# Patient Record
Sex: FEMALE | URBAN - METROPOLITAN AREA
[De-identification: names, ages, dates, MRNs, and addresses within clinical notes are randomized per-mention and may not be internally consistent; named-entity substitution may affect disease eponyms.]

---

## 2023-09-25 ENCOUNTER — APPOINTMENT (RX ONLY)
Dept: URBAN - METROPOLITAN AREA CLINIC 25 | Facility: CLINIC | Age: 28
Setting detail: DERMATOLOGY
End: 2023-09-25

## 2023-09-25 DIAGNOSIS — L65.0 TELOGEN EFFLUVIUM: ICD-10-CM

## 2023-09-25 DIAGNOSIS — L64.8 OTHER ANDROGENIC ALOPECIA: ICD-10-CM

## 2023-09-25 DIAGNOSIS — L70.0 ACNE VULGARIS: ICD-10-CM

## 2023-09-25 PROCEDURE — ? COUNSELING

## 2023-09-25 PROCEDURE — ? PRESCRIPTION

## 2023-09-25 PROCEDURE — ? OTHER

## 2023-09-25 PROCEDURE — 99204 OFFICE O/P NEW MOD 45 MIN: CPT

## 2023-09-25 PROCEDURE — ? PRESCRIPTION MEDICATION MANAGEMENT

## 2023-09-25 RX ORDER — SPIRONOLACTONE 50 MG/1
TABLET, FILM COATED ORAL
Qty: 90 | Refills: 4 | Status: ERX | COMMUNITY
Start: 2023-09-25

## 2023-09-25 RX ADMIN — SPIRONOLACTONE: 50 TABLET, FILM COATED ORAL at 00:00

## 2023-09-25 ASSESSMENT — LOCATION SIMPLE DESCRIPTION DERM
LOCATION SIMPLE: RIGHT CHEEK
LOCATION SIMPLE: LEFT CHEEK
LOCATION SIMPLE: ANTERIOR SCALP
LOCATION SIMPLE: SCALP

## 2023-09-25 ASSESSMENT — LOCATION ZONE DERM
LOCATION ZONE: SCALP
LOCATION ZONE: FACE

## 2023-09-25 ASSESSMENT — LOCATION DETAILED DESCRIPTION DERM
LOCATION DETAILED: LEFT SUPERIOR PARIETAL SCALP
LOCATION DETAILED: MID-FRONTAL SCALP
LOCATION DETAILED: LEFT INFERIOR CENTRAL MALAR CHEEK
LOCATION DETAILED: RIGHT CENTRAL MALAR CHEEK

## 2023-09-25 NOTE — HPI: HAIR LOSS
Previous Labs: Yes
How Did The Hair Loss Occur?: gradual in onset
How Severe Is Your Hair Loss?: mild
What Hair Products Do You Use?: Cesario hair story

## 2023-09-25 NOTE — PROCEDURE: COUNSELING
Detail Level: Zone
Detail Level: Detailed
Azithromycin Counseling:  I discussed with the patient the risks of azithromycin including but not limited to GI upset, allergic reaction, drug rash, diarrhea, and yeast infections.
Topical Sulfur Applications Counseling: Topical Sulfur Counseling: Patient counseled that this medication may cause skin irritation or allergic reactions.  In the event of skin irritation, the patient was advised to reduce the amount of the drug applied or use it less frequently.   The patient verbalized understanding of the proper use and possible adverse effects of topical sulfur application.  All of the patient's questions and concerns were addressed.
Doxycycline Pregnancy And Lactation Text: This medication is Pregnancy Category D and not consider safe during pregnancy. It is also excreted in breast milk but is considered safe for shorter treatment courses.
Bactrim Counseling:  I discussed with the patient the risks of sulfa antibiotics including but not limited to GI upset, allergic reaction, drug rash, diarrhea, dizziness, photosensitivity, and yeast infections.  Rarely, more serious reactions can occur including but not limited to aplastic anemia, agranulocytosis, methemoglobinemia, blood dyscrasias, liver or kidney failure, lung infiltrates or desquamative/blistering drug rashes.
Azelaic Acid Pregnancy And Lactation Text: This medication is considered safe during pregnancy and breast feeding.
Aklief Pregnancy And Lactation Text: It is unknown if this medication is safe to use during pregnancy.  It is unknown if this medication is excreted in breast milk.  Breastfeeding women should use the topical cream on the smallest area of the skin for the shortest time needed while breastfeeding.  Do not apply to nipple and areola.
Topical Clindamycin Counseling: Patient counseled that this medication may cause skin irritation or allergic reactions.  In the event of skin irritation, the patient was advised to reduce the amount of the drug applied or use it less frequently.   The patient verbalized understanding of the proper use and possible adverse effects of clindamycin.  All of the patient's questions and concerns were addressed.
Minocycline Counseling: Patient advised regarding possible photosensitivity and discoloration of the teeth, skin, lips, tongue and gums.  Patient instructed to avoid sunlight, if possible.  When exposed to sunlight, patients should wear protective clothing, sunglasses, and sunscreen.  The patient was instructed to call the office immediately if the following severe adverse effects occur:  hearing changes, easy bruising/bleeding, severe headache, or vision changes.  The patient verbalized understanding of the proper use and possible adverse effects of minocycline.  All of the patient's questions and concerns were addressed.
Sarecycline Counseling: Patient advised regarding possible photosensitivity and discoloration of the teeth, skin, lips, tongue and gums.  Patient instructed to avoid sunlight, if possible.  When exposed to sunlight, patients should wear protective clothing, sunglasses, and sunscreen.  The patient was instructed to call the office immediately if the following severe adverse effects occur:  hearing changes, easy bruising/bleeding, severe headache, or vision changes.  The patient verbalized understanding of the proper use and possible adverse effects of sarecycline.  All of the patient's questions and concerns were addressed.
Dapsone Pregnancy And Lactation Text: This medication is Pregnancy Category C and is not considered safe during pregnancy or breast feeding.
Tazorac Counseling:  Patient advised that medication is irritating and drying.  Patient may need to apply sparingly and wash off after an hour before eventually leaving it on overnight.  The patient verbalized understanding of the proper use and possible adverse effects of tazorac.  All of the patient's questions and concerns were addressed.
Birth Control Pills Pregnancy And Lactation Text: This medication should be avoided if pregnant and for the first 30 days post-partum.
Tetracycline Pregnancy And Lactation Text: This medication is Pregnancy Category D and not consider safe during pregnancy. It is also excreted in breast milk.
Include Pregnancy/Lactation Warning?: No
High Dose Vitamin A Counseling: Side effects reviewed, pt to contact office should one occur.
Topical Sulfur Applications Pregnancy And Lactation Text: This medication is Pregnancy Category C and has an unknown safety profile during pregnancy. It is unknown if this topical medication is excreted in breast milk.
Isotretinoin Counseling: Patient should get monthly blood tests, not donate blood, not drive at night if vision affected, not share medication, and not undergo elective surgery for 6 months after tx completed. Side effects reviewed, pt to contact office should one occur.
Spironolactone Pregnancy And Lactation Text: This medication can cause feminization of the male fetus and should be avoided during pregnancy. The active metabolite is also found in breast milk.
Winlevi Pregnancy And Lactation Text: This medication is considered safe during pregnancy and breastfeeding.
Bactrim Pregnancy And Lactation Text: This medication is Pregnancy Category D and is known to cause fetal risk.  It is also excreted in breast milk.
Topical Retinoid counseling:  Patient advised to apply a pea-sized amount only at bedtime and wait 30 minutes after washing their face before applying.  If too drying, patient may add a non-comedogenic moisturizer. The patient verbalized understanding of the proper use and possible adverse effects of retinoids.  All of the patient's questions and concerns were addressed.
Erythromycin Counseling:  I discussed with the patient the risks of erythromycin including but not limited to GI upset, allergic reaction, drug rash, diarrhea, increase in liver enzymes, and yeast infections.
Benzoyl Peroxide Counseling: Patient counseled that medicine may cause skin irritation and bleach clothing.  In the event of skin irritation, the patient was advised to reduce the amount of the drug applied or use it less frequently.   The patient verbalized understanding of the proper use and possible adverse effects of benzoyl peroxide.  All of the patient's questions and concerns were addressed.
Azithromycin Pregnancy And Lactation Text: This medication is considered safe during pregnancy and is also secreted in breast milk.
Tazorac Pregnancy And Lactation Text: This medication is not safe during pregnancy. It is unknown if this medication is excreted in breast milk.
Doxycycline Counseling:  Patient counseled regarding possible photosensitivity and increased risk for sunburn.  Patient instructed to avoid sunlight, if possible.  When exposed to sunlight, patients should wear protective clothing, sunglasses, and sunscreen.  The patient was instructed to call the office immediately if the following severe adverse effects occur:  hearing changes, easy bruising/bleeding, severe headache, or vision changes.  The patient verbalized understanding of the proper use and possible adverse effects of doxycycline.  All of the patient's questions and concerns were addressed.
Azelaic Acid Counseling: Patient counseled that medicine may cause skin irritation and to avoid applying near the eyes.  In the event of skin irritation, the patient was advised to reduce the amount of the drug applied or use it less frequently.   The patient verbalized understanding of the proper use and possible adverse effects of azelaic acid.  All of the patient's questions and concerns were addressed.
Topical Clindamycin Pregnancy And Lactation Text: This medication is Pregnancy Category B and is considered safe during pregnancy. It is unknown if it is excreted in breast milk.
Birth Control Pills Counseling: Birth Control Pill Counseling: I discussed with the patient the potential side effects of OCPs including but not limited to increased risk of stroke, heart attack, thrombophlebitis, deep venous thrombosis, hepatic adenomas, breast changes, GI upset, headaches, and depression.  The patient verbalized understanding of the proper use and possible adverse effects of OCPs. All of the patient's questions and concerns were addressed.
Isotretinoin Pregnancy And Lactation Text: This medication is Pregnancy Category X and is considered extremely dangerous during pregnancy. It is unknown if it is excreted in breast milk.
High Dose Vitamin A Pregnancy And Lactation Text: High dose vitamin A therapy is contraindicated during pregnancy and breast feeding.
Dapsone Counseling: I discussed with the patient the risks of dapsone including but not limited to hemolytic anemia, agranulocytosis, rashes, methemoglobinemia, kidney failure, peripheral neuropathy, headaches, GI upset, and liver toxicity.  Patients who start dapsone require monitoring including baseline LFTs and weekly CBCs for the first month, then every month thereafter.  The patient verbalized understanding of the proper use and possible adverse effects of dapsone.  All of the patient's questions and concerns were addressed.
Aklief counseling:  Patient advised to apply a pea-sized amount only at bedtime and wait 30 minutes after washing their face before applying.  If too drying, patient may add a non-comedogenic moisturizer.  The most commonly reported side effects including irritation, redness, scaling, dryness, stinging, burning, itching, and increased risk of sunburn.  The patient verbalized understanding of the proper use and possible adverse effects of retinoids.  All of the patient's questions and concerns were addressed.
Benzoyl Peroxide Pregnancy And Lactation Text: This medication is Pregnancy Category C. It is unknown if benzoyl peroxide is excreted in breast milk.
Winlevi Counseling:  I discussed with the patient the risks of topical clascoterone including but not limited to erythema, scaling, itching, and stinging. Patient voiced their understanding.
Spironolactone Counseling: Patient advised regarding risks of diarrhea, abdominal pain, hyperkalemia, birth defects (for female patients), liver toxicity and renal toxicity. The patient may need blood work to monitor liver and kidney function and potassium levels while on therapy. The patient verbalized understanding of the proper use and possible adverse effects of spironolactone.  All of the patient's questions and concerns were addressed.
Erythromycin Pregnancy And Lactation Text: This medication is Pregnancy Category B and is considered safe during pregnancy. It is also excreted in breast milk.
Tetracycline Counseling: Patient counseled regarding possible photosensitivity and increased risk for sunburn.  Patient instructed to avoid sunlight, if possible.  When exposed to sunlight, patients should wear protective clothing, sunglasses, and sunscreen.  The patient was instructed to call the office immediately if the following severe adverse effects occur:  hearing changes, easy bruising/bleeding, severe headache, or vision changes.  The patient verbalized understanding of the proper use and possible adverse effects of tetracycline.  All of the patient's questions and concerns were addressed. Patient understands to avoid pregnancy while on therapy due to potential birth defects.
Topical Retinoid Pregnancy And Lactation Text: This medication is Pregnancy Category C. It is unknown if this medication is excreted in breast milk.

## 2023-09-25 NOTE — PROCEDURE: PRESCRIPTION MEDICATION MANAGEMENT
Plan: Patient not family planning at this time. \\nDiscontinue if becomes pregnant, patient understands this is not meant for pregnancy \\nCan lower BP, raise K in blood (not routinely checked in patients under 45)\\nCan have worsening acne in the beginning along with breast or uterine cramp\\n\\Nikky reserve sodium sulfacetamide sulphur cream, will call if acne does not clear up with restarting medication \\n\\nGlytone sal acid toner
Render In Strict Bullet Format?: No
Initiate Treatment: spironolactone 50 mg once daily
Detail Level: Zone
Plan: Discussed treatment options:\\nRogaine 5% twice daily\\nspironolactone 50 mg QD (restart)\\nViviscal \\nCosmetic PRP injections (will consider in future)\\nLow light red level therapy (patient has at home)\\nTopic hair fibers\\nHair loss shampoos \\nBiotin—discontinue, this is in viviscal, nutrofol, and ISDIN. Does contribute to acne, so up to her if she wishes to continue \\nNutrition

## 2023-09-25 NOTE — PROCEDURE: OTHER
Detail Level: Zone
Note Text (......Xxx Chief Complaint.): This diagnosis correlates with the
Other (Free Text): Related to stress for grad school
Render Risk Assessment In Note?: no

## 2024-12-15 ENCOUNTER — HOSPITAL ENCOUNTER (OUTPATIENT)
Age: 29
Discharge: HOME | End: 2024-12-15
Attending: OBSTETRICS & GYNECOLOGY | Admitting: OBSTETRICS & GYNECOLOGY
Payer: COMMERCIAL

## 2024-12-15 VITALS
HEART RATE: 96 BPM | TEMPERATURE: 98.2 F | RESPIRATION RATE: 17 BRPM | SYSTOLIC BLOOD PRESSURE: 129 MMHG | DIASTOLIC BLOOD PRESSURE: 76 MMHG

## 2024-12-15 PROCEDURE — 99282 EMERGENCY DEPT VISIT SF MDM: CPT

## 2024-12-15 PROCEDURE — 59025 FETAL NON-STRESS TEST: CPT

## 2024-12-15 NOTE — PROGRESS NOTES
OB ED     Pt to [facility] with complaints of decreased fetal movements this am.. EFM and TOCO applied. Abd palpated soft.  denies LOF or VB. [facility] notified of patient's arrival.

## 2024-12-15 NOTE — PROGRESS NOTES
Reactive NST obtained.D/C instructions gone over with pt. Pt to return with LOF, VB like a period,  More than 6 contractions in an hour that don't improve with water and rest. Decreased fetal movement less than 10 movements in 2 hours. Pt verbalized understanding. Pt left unit ambulatory without further complaints. Pt accompanied by S.O.

## 2024-12-15 NOTE — H&P
Obstetrics History & Physical/OB ED note    Name: Caryn Mckee MRN: [id number]     YOB: 1995  Age: 29 y.o.  Sex: female      Reason for Presentation:  decreased fetal movement    HPI: Caryn Mckee is a 29 y.o.  female with Estimated Date of Delivery: 3/1/25, pt is 29w1d today with c/o decreased FM.  Has now felt some small movements in [facility].  No leaking, no bleeding, no ctx's.    Prenatal records reviewed, [facility] records rev'd.        Past History:  OB History    Para Term  AB Living   1             SAB IAB Ectopic Molar Multiple Live Births                    # Outcome Date GA Lbr Rosalio/2nd Weight Sex Type Anes PTL Lv   1               No past medical history on file.  No past surgical history on file.  Social History     Tobacco Use    Smoking status: Not on file    Smokeless tobacco: Not on file   Substance Use Topics    Alcohol use: Not on file     Prior to Admission medications    Not on File     No Known Allergies    Physical Exam:  VITALS:  /76   Pulse 96   Temp 98.2 °F (36.8 °C) (Oral)   Resp 17   LMP 2024   CONSTITUTIONAL:  awake, alert, cooperative, no apparent distress, and appears stated age  ABDOMEN:  non-tender  FHTs: Reactive and reassuring; Reactive NST   Uterine activity/Contractions on monitor: None seen on monitor  Membranes: intact  Cervix: exam deferred      Assessment:  IUP at 29w1d  Decreased FM. Pt has now felt movements in [facility].  Reassuring fetal status - reactive NST.    Plan: Discharge home, follow-up at next OB appointment  Discussed/reviewed daily kick counts with pt.

## 2024-12-16 ENCOUNTER — HOSPITAL ENCOUNTER (OUTPATIENT)
Age: 29
Discharge: HOME | End: 2024-12-16
Attending: OBSTETRICS & GYNECOLOGY | Admitting: OBSTETRICS & GYNECOLOGY
Payer: COMMERCIAL

## 2024-12-16 PROCEDURE — 96372 THER/PROPH/DIAG INJ SC/IM: CPT

## 2024-12-16 PROCEDURE — 6360000002 HC RX W HCPCS: Performed by: OBSTETRICS & GYNECOLOGY

## 2024-12-16 RX ADMIN — RHO(D) IMMUNE GLOBULIN (HUMAN) 300 MCG: 1500 SOLUTION INTRAMUSCULAR at 08:17

## 2024-12-17 PROBLEM — O99.340 MENTAL DISORDER AFFECTING PREGNANCY: Status: ACTIVE | Noted: 2024-08-13

## 2024-12-17 PROBLEM — F41.9 ANXIETY: Status: ACTIVE | Noted: 2022-01-06

## 2024-12-17 PROBLEM — L21.9 SEBORRHEIC DERMATITIS: Status: RESOLVED | Noted: 2024-08-19 | Resolved: 2024-12-17

## 2024-12-17 PROBLEM — O24.410 DIET CONTROLLED GESTATIONAL DIABETES MELLITUS (GDM) IN THIRD TRIMESTER: Status: ACTIVE | Noted: 2024-12-17

## 2024-12-17 PROBLEM — L70.0 CYSTIC ACNE: Status: RESOLVED | Noted: 2023-02-21 | Resolved: 2024-12-17

## 2024-12-17 PROBLEM — O99.891 DISORDER OF MUSCULOSKELETAL SYSTEM DURING PREGNANCY: Status: ACTIVE | Noted: 2024-12-12

## 2024-12-17 PROBLEM — F52.5: Status: ACTIVE | Noted: 2023-02-21

## 2024-12-17 PROBLEM — R51.9 HEADACHE: Status: RESOLVED | Noted: 2022-01-06 | Resolved: 2024-12-17

## 2024-12-17 NOTE — PROGRESS NOTES
Meghan Devi  : 1995  Primary: [facility] (Com[facility])  Secondary:  [facility] @ [address]  [place] [address]  Phone: [phone]  Fax: [phone] Plan Frequency: 1x/week for 90 days  Plan of Care/Certification Expiration Date: 25        Plan of Care/Certification Expiration Date:  Plan of Care/Certification Expiration Date: 25    Frequency/Duration: Plan Frequency: 1x/week for 90 days      Time In/Out:   Time In: 1015  Time Out: 1130      PT Visit Info:    Progress Note Due Date: 25  Total # of Visits to Date: 1      Visit Count:  1    OUTPATIENT PHYSICAL THERAPY:   Treatment Note 2024       Episode  (PFPT)               Treatment Diagnosis:    Other lack of coordination  Stress incontinence (female) (male)  Contributing Diagnosis:  Dyspareunia (N94.1), Frequency of micturition (R35.0), Pelvic and perineal pain (R10.2), and Pelvic floor dysfunction in female (M62.98)  Medical/Referring Diagnosis:    Other specified diseases and conditions complicating pregnancy [O99.891]    Referring Physician:  Laverne Goodman APRN - NP  MD Orders:  PT Eval and Treat   Return MD Appt: 24  Date of Onset:  Onset Date: 24 (approximate date)     Allergies:   Patient has no allergy information on record.  Restrictions/Precautions:   None      Interventions Planned (Treatment may consist of any combination of the following):     See Assessment Note    Subjective Comments:   Pregnant, due 3/2/25, pelvic pain (pubic bone- worse with sitting), dyspareunia    Initial Pain Level::     0/10  Post Session Pain Level:       0/10  Medications Last Reviewed:  2024  Updated Objective Findings:  See Evaluation Note from today    Treatment   THERAPEUTIC EXERCISE: (10 minutes): Exercises per grid below to improve mobility, strength, and coordination.  Required minimal visual, verbal, and tactile cues to promote proper body

## 2024-12-17 NOTE — THERAPY EVALUATION
drink water but maybe didn't do this enough, after getting off plane noticed some pretty significant pain in her pelvis and pubic area  Only flares when she's been sitting or has been a little swollen  Movement is very helpful    Has gestational diabetes, needs to exercise for 20 minutes after meals that contain a lot of carbs  Consults with nutritionist next week  Exercise: walking, lower body exercise (KB squats, [place], lunges)  Used to strength train quite a bit    Some YUMIKO with a sneeze, if she kegels in time she can maintain continence    H/o vaginismus, 5 years, had botox (2018/2019)  But pain with intercourse has flared in the last two months    Previous medical management includes PFPT (prior to pregnancy)  Current medical management includes none.    She does have a history of DM. Gestational diabetes.  She does not have a history of sleep apnea.    Urinary: Frequency 8-11x/day, 1x/night.  Positive for stress urinary incontinence, urinary frequency, and incomplete emptying.   Negative for urge urinary incontinence, urinary hesitancy/intermittency, dysuria, hematuria, and nocturnal enuresis.   Pt does use thin pad for urinary protection; 1 pads per day (PPD).         Fluid intake: 64 oz water/day; bladder irritants include: coffee (1 cup, AM). Pt does not limit fluid intake due to bladder control.    Bowel: Frequency 1x/day.   Positive for  none .   Negative for pain with bowel movement, pushing/straining with bowel movement, fecal incontinence, constipation, and incomplete emptying.   Pt does not use thin pad for bowel protection; 0 pads per day (PPD). Use of stool softeners or laxatives? Magnesium Citrate    Sexual: Pt is  sexually active with male partners. Contraception/birth control: currently pregnant. Positive for dyspareunia. Pain is superficial. History of sexual abuse: No    Pelvic Organ Prolapse/Pelvic Pain:   Location: vaginal opening. Worse with vaginal penetration. Relieved with cessation

## 2024-12-19 ENCOUNTER — HOSPITAL ENCOUNTER (OUTPATIENT)
Dept: PHYSICAL THERAPY | Age: 29
Setting detail: RECURRING SERIES
Discharge: HOME | End: 2024-12-22
Payer: COMMERCIAL

## 2024-12-19 DIAGNOSIS — R27.8 OTHER LACK OF COORDINATION: Primary | ICD-10-CM

## 2024-12-19 DIAGNOSIS — N39.3 STRESS INCONTINENCE (FEMALE) (MALE): ICD-10-CM

## 2024-12-19 PROCEDURE — 97530 THERAPEUTIC ACTIVITIES: CPT

## 2024-12-19 PROCEDURE — 97110 THERAPEUTIC EXERCISES: CPT

## 2024-12-19 PROCEDURE — 97162 PT EVAL MOD COMPLEX 30 MIN: CPT

## 2024-12-19 ASSESSMENT — PAIN SCALES - GENERAL: PAINLEVEL_OUTOF10: 0

## 2024-12-27 ENCOUNTER — TELEMEDICINE (OUTPATIENT)
Dept: OBGYN CLINIC | Age: 29
End: 2024-12-27

## 2024-12-27 DIAGNOSIS — O24.410 DIET CONTROLLED GESTATIONAL DIABETES MELLITUS (GDM) IN THIRD TRIMESTER: Primary | ICD-10-CM

## 2024-12-27 RX ORDER — LANCETS 33 GAUGE
EACH MISCELLANEOUS
COMMUNITY

## 2024-12-27 RX ORDER — BLOOD-GLUCOSE METER
EACH MISCELLANEOUS
COMMUNITY

## 2024-12-27 RX ORDER — BLOOD SUGAR DIAGNOSTIC
STRIP MISCELLANEOUS
Qty: 150 STRIP | Refills: 5 | Status: SHIPPED | OUTPATIENT
Start: 2024-12-27

## 2024-12-27 SDOH — ECONOMIC STABILITY: FOOD INSECURITY: WITHIN THE PAST 12 MONTHS, THE FOOD YOU BOUGHT JUST DIDN'T LAST AND YOU DIDN'T HAVE MONEY TO GET MORE.: NEVER TRUE

## 2024-12-27 SDOH — ECONOMIC STABILITY: FOOD INSECURITY: WITHIN THE PAST 12 MONTHS, YOU WORRIED THAT YOUR FOOD WOULD RUN OUT BEFORE YOU GOT MONEY TO BUY MORE.: NEVER TRUE

## 2024-12-27 SDOH — ECONOMIC STABILITY: TRANSPORTATION INSECURITY
IN THE PAST 12 MONTHS, HAS LACK OF TRANSPORTATION KEPT YOU FROM MEETINGS, WORK, OR FROM GETTING THINGS NEEDED FOR DAILY LIVING?: NO

## 2024-12-27 SDOH — ECONOMIC STABILITY: INCOME INSECURITY: HOW HARD IS IT FOR YOU TO PAY FOR THE VERY BASICS LIKE FOOD, HOUSING, MEDICAL CARE, AND HEATING?: NOT HARD AT ALL

## 2024-12-27 NOTE — PROGRESS NOTES
[facility]  Diabetes Self-Management Education & Support Program  Pre-program Assessment    Reason for Referral: Gestational Diabetes  Referral Source: Sana Alexis MD  Services requested: DSMES - Pregnancy    ASSESSMENT    From my perspective, the participant would benefit from DSMES specifically related to Reducing Risks and Monitoring.     Will adapt DSMES program to address participant's issues as noted in the Problem Areas in Diabetes (PAID) Scale.    During the program, we will focus on providing DSMES that specifically addresses participant's interest in Reducing Risks and Healthy Eating as shown by their reported readiness to change.    The participant would be best served by weekly remote monitoring.    Issues with obtaining Diabetic Testing Supplies? No, only received 100 test strips. Will send in updated script.    Diabetes Self-Management Education Follow-up Visit: 1/16/25 or sooner as needed.       Characteristics to Learning   Barriers to Learning   [] Cognitive loss  [] Intellectual delay/cognitive impairment  [] Psychiatric disorder  [] Visually impaired  [] Hearing loss                 [] Low literacy (difficulty with written text)  [] Low numeracy (difficulty with mathematical information)  [] Low health literacy (difficulty with understanding health information & services  [] Language  [] Functional limitation  [] Pain   [] Financial  [] Transportation  [x] None   Favorite Ways to Learn   [] Lecture  [] Slides  [x] Reading [x] Video-Internet  [] Cassettes/CDs/MP3's  [] Interactive Small Groups [] Other       Clinical Presentation  Caryn Mckee is a 29 y.o. White female referred for diabetes self-management education. Participant has GDM this pregnancy.     Family history negative for diabetes, her father is prediabetic. Patient reports not receiving DSMES services in the past. Patient declined MNT Services. Has history of body-building and understanding of nutrition.

## 2025-01-02 ENCOUNTER — HOSPITAL ENCOUNTER (OUTPATIENT)
Dept: PHYSICAL THERAPY | Age: 30
Setting detail: RECURRING SERIES
Discharge: HOME | End: 2025-01-05
Payer: COMMERCIAL

## 2025-01-02 PROCEDURE — 97110 THERAPEUTIC EXERCISES: CPT

## 2025-01-02 PROCEDURE — 97140 MANUAL THERAPY 1/> REGIONS: CPT

## 2025-01-02 PROCEDURE — 97530 THERAPEUTIC ACTIVITIES: CPT

## 2025-01-02 ASSESSMENT — PAIN SCALES - GENERAL: PAINLEVEL_OUTOF10: 0

## 2025-01-02 NOTE — PROGRESS NOTES
Caryn Mckee  : 1995  Primary: [facility] (Com[facility])  Secondary:  [facility] @ [address]  [facility] [address]  Phone: [phone]  Fax: [phone] Plan Frequency: 1x/week for 90 days  Plan of Care/Certification Expiration Date: 25        Plan of Care/Certification Expiration Date:  Plan of Care/Certification Expiration Date: 25    Frequency/Duration: Plan Frequency: 1x/week for 90 days      Time In/Out:   Time In: 1509  Time Out: 1615     PT Visit Info:    Progress Note Due Date: 25  Total # of Visits to Date: 2      Visit Count:  2    OUTPATIENT PHYSICAL THERAPY:   Treatment Note 2025       Episode  (PFPT)               Treatment Diagnosis:    Other lack of coordination  Stress incontinence (female) (male)  Contributing Diagnosis:  Dyspareunia (N94.1), Frequency of micturition (R35.0), Pelvic and perineal pain (R10.2), and Pelvic floor dysfunction in female (M62.98)  Medical/Referring Diagnosis:    Other specified diseases and conditions complicating pregnancy [O99.891]    Referring Physician:  Shasha Isidro APRN - NP  MD Orders:  PT Eval and Treat   Return MD Appt: 24  Date of Onset:  Onset Date: 24 (approximate date)     Allergies:   Patient has no allergy information on record.  Restrictions/Precautions:   None      Interventions Planned (Treatment may consist of any combination of the following):     See Assessment Note    Subjective Comments:   Pregnant, due 3/2/25, pelvic pain (pubic bone- worse with sitting), dyspareunia    Did order the [employer] Pelvic floor perineal massage tool  Did attempt to do perineal massage but cannot reach easily  Tried to have sex last week but had a lot of tension - worried about cervical checks with OB  Asked about kiwi tool from [employer] for addressing superficial tension      Initial Pain Level::     0/10  Post Session Pain Level:       0/10  Medications

## 2025-01-03 PROBLEM — O99.343 ANXIETY DURING PREGNANCY, ANTEPARTUM, THIRD TRIMESTER: Status: ACTIVE | Noted: 2022-01-06

## 2025-01-03 PROBLEM — F52.5: Status: RESOLVED | Noted: 2023-02-21 | Resolved: 2025-01-03

## 2025-01-03 PROBLEM — O09.93 HIGH-RISK PREGNANCY IN THIRD TRIMESTER: Status: ACTIVE | Noted: 2025-01-03

## 2025-01-03 PROBLEM — O99.891 DISORDER OF MUSCULOSKELETAL SYSTEM DURING PREGNANCY: Status: RESOLVED | Noted: 2024-12-12 | Resolved: 2025-01-03

## 2025-01-09 ENCOUNTER — HOSPITAL ENCOUNTER (OUTPATIENT)
Dept: PHYSICAL THERAPY | Age: 30
Setting detail: RECURRING SERIES
Discharge: HOME | End: 2025-01-12
Payer: COMMERCIAL

## 2025-01-09 PROCEDURE — 97110 THERAPEUTIC EXERCISES: CPT

## 2025-01-09 PROCEDURE — 97530 THERAPEUTIC ACTIVITIES: CPT

## 2025-01-09 ASSESSMENT — PAIN SCALES - GENERAL: PAINLEVEL_OUTOF10: 0

## 2025-01-09 NOTE — PROGRESS NOTES
MD Yossi [facility] AMB   1/16/2025  3:00 PM Cathi Guadarrama, PT [facility]   1/23/2025  3:00 PM Cathi Guadarrama, PT [facility]   1/30/2025  3:00 PM Cathi Guadarrama, PT [facility]

## 2025-01-16 ENCOUNTER — ROUTINE PRENATAL (OUTPATIENT)
Dept: OBGYN CLINIC | Age: 30
End: 2025-01-16

## 2025-01-16 ENCOUNTER — HOSPITAL ENCOUNTER (OUTPATIENT)
Dept: PHYSICAL THERAPY | Age: 30
Setting detail: RECURRING SERIES
Discharge: HOME | End: 2025-01-19
Payer: COMMERCIAL

## 2025-01-16 ENCOUNTER — OFFICE VISIT (OUTPATIENT)
Dept: OBGYN CLINIC | Age: 30
End: 2025-01-16

## 2025-01-16 VITALS
DIASTOLIC BLOOD PRESSURE: 78 MMHG | BODY MASS INDEX: 33.26 KG/M2 | HEIGHT: 65 IN | SYSTOLIC BLOOD PRESSURE: 121 MMHG | WEIGHT: 199.6 LBS

## 2025-01-16 DIAGNOSIS — O24.410 DIET CONTROLLED GESTATIONAL DIABETES MELLITUS (GDM) IN THIRD TRIMESTER: Primary | ICD-10-CM

## 2025-01-16 DIAGNOSIS — O99.343 ANXIETY DURING PREGNANCY, ANTEPARTUM, THIRD TRIMESTER: ICD-10-CM

## 2025-01-16 DIAGNOSIS — F41.9 ANXIETY DURING PREGNANCY, ANTEPARTUM, THIRD TRIMESTER: ICD-10-CM

## 2025-01-16 DIAGNOSIS — O24.410 DIET CONTROLLED GESTATIONAL DIABETES MELLITUS (GDM) IN THIRD TRIMESTER: ICD-10-CM

## 2025-01-16 DIAGNOSIS — O09.93 HIGH-RISK PREGNANCY IN THIRD TRIMESTER: Primary | ICD-10-CM

## 2025-01-16 DIAGNOSIS — Z3A.33 33 WEEKS GESTATION OF PREGNANCY: ICD-10-CM

## 2025-01-16 PROCEDURE — 97110 THERAPEUTIC EXERCISES: CPT

## 2025-01-16 PROCEDURE — 97140 MANUAL THERAPY 1/> REGIONS: CPT

## 2025-01-16 PROCEDURE — 97530 THERAPEUTIC ACTIVITIES: CPT

## 2025-01-16 RX ORDER — FAMOTIDINE 20 MG/1
TABLET, FILM COATED ORAL
COMMUNITY

## 2025-01-16 ASSESSMENT — PATIENT HEALTH QUESTIONNAIRE - PHQ9
SUM OF ALL RESPONSES TO PHQ QUESTIONS 1-9: 0
SUM OF ALL RESPONSES TO PHQ QUESTIONS 1-9: 0
SUM OF ALL RESPONSES TO PHQ9 QUESTIONS 1 & 2: 0
SUM OF ALL RESPONSES TO PHQ QUESTIONS 1-9: 0
SUM OF ALL RESPONSES TO PHQ QUESTIONS 1-9: 0
2. FEELING DOWN, DEPRESSED OR HOPELESS: NOT AT ALL
1. LITTLE INTEREST OR PLEASURE IN DOING THINGS: NOT AT ALL

## 2025-01-16 ASSESSMENT — PAIN SCALES - GENERAL: PAINLEVEL_OUTOF10: 0

## 2025-01-16 NOTE — PATIENT INSTRUCTIONS
Your Maternity Check List   Before Arriving to the Hospital     Find an OBGYN Doctor: [email or web address]  Maternity Pre-registration for Hospital: [email or web address]  Sign up for a Hospital Tour: www.[employer]/about-us/classes-events  [place] for Prenatal Classes: www.[employer]/about-us/classes-events   Car seat Safety Check: https://www.[employer].org/coalition/safe-kids-[facility]   Learn More: www.[employer]/health-care-services/womens-health/maternity   Download the [employer] Pregnancy Pancho: (Scan QR Code below):  See educational videos, track your pregnancy, and Mom/Baby Care videos          Resources for Depression/Anxiety  Postpartum Support International (PSI).    PSI Warmline:  0-063-380-4PPD (1884).  WWW.POSTPARTUM.NET    Mom's IMPACTT  https://[facility].org/medical-services/womens/reproductive-behavioral-health/moms-impactt       In order to optimize maternal, fetal, and  health, we recommend the following vaccinations.   Flu- yearly (https://www.highriskpregnancyinfo.org/flu-facts-for-pregnancy)  Consider Covid vaccination/booster (https://www.highriskpregnancyinfo.org/covid-19-pregnancy)  TDaP after 28 weeks each pregnancy (https://www.highriskpregnancyinfo.org/tdap)  Consider RSV vaccine 32-36 weeks of pregnancy, if Sept- January.  (https://www.highriskpregnancyinfo.org/rsv)         Discharged

## 2025-01-16 NOTE — PROGRESS NOTES
action  Common problems in diabetes self-care   Hypoglycemia   Hyperglycemia   Sick days   Pattern management   Disaster preparedness plan       The participant has an action plan for   Hypoglycemia Yes  Hyperglycemia Yes  Sick days NA  During disasters NA       DATE DSMES TOPIC EVALUATION     1/16/2025 HOW DOES PHYSICAL ACTIVITY AFFECT MY DIABETES?   Benefits of physical activity   Beginning a program of physical activity   Walking   Pedometers   Goal setting   Structured physical activity program   Aerobic activity   Resistance   Flexibility   Balance   Physical activity program progression   Safety issues   Barriers to physical activity   Facilitators of physical activity        The participant has established a regular physical activity plan Yes         I have personally reviewed the health record, including provider notes, laboratory data and current medications before making these care and education recommendations. The time spent in this effort is included in the total time.    Total time spent for this encounter:  15 minutes    An electronic signature was used to authenticate this note.  Adelaida Lopez, MSN Ed, [facility] [place]        ICD-10-CM    1. Diet controlled gestational diabetes mellitus (GDM) in third trimester  O24.410

## 2025-01-16 NOTE — PROGRESS NOTES
[facility] MATERNAL FETAL MEDICINE    [address] [place] [address]  P- [phone]  [phone]     MFM Consultation    Presents for evaluation of the following chief complaint(s):   Chief Complaint   Patient presents with    High Risk Pregnancy     GDM         Caryn Mckee (1995) is a 29 y.o.  at 33w5d with 3/1/2025, by Last Menstrual Period.     Patient is working part time as a Therapist.   Personal and family history reviewed and updated as indicated.   Records from pregnancy reviewed. Chart updated to portray current issues.     GDM:  Patient with recent diagnosis of gestational diabetes. Full details of current status in A/P section of problem list.     Pt is scheduled to see Primary OB ([facility]) on 25.    No recent HA's.  Denies Edema. Denies Pre-eclamptic symptoms. No bleeding or LOF.  No contractions or cramping. No vaginal pressure recently. Reports good fetal movement.      Reports a \"itchy\" rash on RLE; has worsened over the past 10 days and taking Benadryl with no relief.     Mood evaluated today based on discussion with pt and PHQ screen. Mood concerns addressed as needed.        2025    10:22 AM   PHQ-9    Little interest or pleasure in doing things 0   Feeling down, depressed, or hopeless 0   PHQ-2 Score 0   PHQ-9 Total Score 0      Mood Reassuring today  Recommend lifestyle/behavioral modifications to enhance mood (exercise, sunshine, mood apps, nutritional modifications, etc).     Reviewed gestational age precautions and activity goals/limitations  Nutritional counseling as well as specific goals based on current maternal and fetal status    Addressed normal pregnancy complaints, reassured and offered suggestions for care  Options for GERD, constipation, other common complaints reviewed    Reviewed gestational age appropriate preventive care regarding communicable disease transmission and vaccines as appropriate (including flu, TDaP >28wk, RSV 32-36wk

## 2025-01-16 NOTE — ASSESSMENT & PLAN NOTE
Low dose Aspirin  mg daily is recommended to be started at 12-16 weeks (some benefit seen with starting up to 28 weeks) for the prevention of preeclampsia  in high risk women. Consider stopping Aspirin at 36 weeks.  Recommend Vitamin D 2000IU daily and Calcium 1000mg daily to aid in protection of bones and teeth.  Recommend use of PNV daily with well-balanced diet.  Unless instructed otherwise, recommend continuation of physical activity throughout pregnancy.       Genetic counseling was performed by physician after reviewing patient's genetic history.    The patient's Down syndrome age associated risk, as well as, risks of additional aneuploidy and genetic syndromes, are reduced by approximately 50% with a normal anatomy ultrasound. Ultrasound alone does not rule out all abnormalities of genetics and development.     Maternal serum screening for aneuploidy was discussed with the patient including first trimester NIA-A/hCG, second trimester Quad screen (either in isolation or sequential with NIA-A) as well as non-invasive prenatal testing (NIPT) for aneuploidy from a maternal blood sample.  Positive predictive and negative predictive values for these tests were explained, questions answered. Patient understands that these are screening tests that only assesses risk for select abnormalities (trisomies 13, 18, and 21, and sex chromosome abnormalities (NIPT), as well as markers for placental health (NIA-A) and risk for open neural tube defects (quad)).  NIPT is designed for high risk populations, but should be considered by all patients who desire the current best option for screening for applicable genetic abnormalities.     Limitations of technology discussed based on maternal age, technical aspects of tests, and maternal BMI reviewed.  All questions answered and concerns discussed.     Patient elected to proceed with NIPT previously at OB office- results low risk.

## 2025-01-16 NOTE — PROGRESS NOTES
Caryn Mckee  : 1995  Primary: [facility] (Commercial)  Secondary:  [facility] @ [address]  [facility] [address]  Phone: [phone]  Fax: [phone] Plan Frequency: 1x/week for 90 days  Plan of Care/Certification Expiration Date: 25        Plan of Care/Certification Expiration Date:  Plan of Care/Certification Expiration Date: 25    Frequency/Duration: Plan Frequency: 1x/week for 90 days      Time In/Out:   Time In: 1308  Time Out: 1404     PT Visit Info:    Progress Note Due Date: 25  Total # of Visits to Date: 4      Visit Count:  4    OUTPATIENT PHYSICAL THERAPY:   Treatment Note 2025       Episode  (PFPT)               Treatment Diagnosis:    Other lack of coordination  Stress incontinence (female) (male)  Contributing Diagnosis:  Dyspareunia (N94.1), Frequency of micturition (R35.0), Pelvic and perineal pain (R10.2), and Pelvic floor dysfunction in female (M62.98)  Medical/Referring Diagnosis:    Other specified diseases and conditions complicating pregnancy [O99.891]    Referring Physician:  Shasha Isidro, APRN - NP  MD Orders:  PT Eval and Treat   Return MD Appt: christiano MOORE on 25  Date of Onset:  Onset Date: 24 (approximate date)     Allergies:   Patient has no known allergies.  Restrictions/Precautions:   None      Interventions Planned (Treatment may consist of any combination of the following):     See Assessment Note    Subjective Comments:   Pregnant, due 3/2/25, pelvic pain (pubic bone- worse with sitting), dyspareunia    33 weeks and 5 days pregnant   is her last day at work until the baby comes  Didn't do perineal massage much this week  Kiwi tool still hasn't come in yet  Did order the [employer] Pelvic floor perineal massage tool    Control over urinary urgency is much improved  Only waking up 1x/night to urinate  HEP: PF drops going well, TrA activation is helpful for

## 2025-01-23 ENCOUNTER — APPOINTMENT (OUTPATIENT)
Dept: PHYSICAL THERAPY | Age: 30
End: 2025-01-23
Payer: COMMERCIAL

## 2025-01-30 ENCOUNTER — HOSPITAL ENCOUNTER (OUTPATIENT)
Dept: PHYSICAL THERAPY | Age: 30
Setting detail: RECURRING SERIES
End: 2025-01-30
Payer: COMMERCIAL

## 2025-01-30 PROCEDURE — 97530 THERAPEUTIC ACTIVITIES: CPT

## 2025-01-30 PROCEDURE — 97140 MANUAL THERAPY 1/> REGIONS: CPT

## 2025-01-30 ASSESSMENT — PAIN SCALES - GENERAL: PAINLEVEL_OUTOF10: 0

## 2025-01-30 NOTE — PROGRESS NOTES
prevention and management techniques, handout provided, answered pt questions 1/2/25 1/16/25 1/30/25     MANUAL THERAPY: (30 minutes): Soft tissue mobilization was utilized and necessary because of the patient's painful spasm and restricted motion of soft tissue.   Date Type Location Comments   1/30/2025 Internal assessment/treatment Via vaginal canal SP and SCS to PFM  Perineal massage     Hip STM and skin rolling to bilateral hip adductors                                 (Used abbreviations: MET - muscle energy technique; SCS- Strain counter strain; CTM-Connective tissue mobilizations; CR- Contract/Relax; SP- Sustained pressure; PIT- Positional inhibition techniques; STM Soft -tissue mobilization; MM- Myofascial mobilization; TrP-Trigger point release; IASTM- Instrument assisted soft tissue mobilizations, TDN-Trigger point dry needling)    Chaperone for Intimate Exam  Chaperone was offered as part of the rooming process. Patient declined and agrees to continue with exam without a chaperone.  Chaperone: None  Pt gives verbal consent to internal vaginal assessment/treatment.    Treatment/Session Summary:    Treatment Assessment:   Pt did well with pushing mechanics today. Pt verbalized good understanding of various voiding strategies and practices to reduce risk of and manage any hemorrhoids. Pt reports good understanding of plan of care, as well as prescribed home exercise program. All questions were answered to pt's satisfaction. Pt was invited to call with any further questions or concerns.  Communication/Consultation:  None today  Equipment provided today:  HEP  Recommendations/Intent for next treatment session: Next visit will focus on PFM downtraining and coordination training, review of perineal massage, continue with hip and trunk stability exercises, mobility as appropriate/needed, pushing mechanics practice and review (pt to bring spouse next visit).  Variation from POC: follow-up  once every two weeks for now    >Total Treatment Billable Duration: 69 minutes  Time In: 1516  Time Out: 1627    Cathi Guadarrama PT         Charge Capture  Events  MedBridge Portal  Appt Desk  Attendance Report     Future Appointments   Date Time Provider Department Center   2/6/2025  9:45 AM [facility] ULTRASOUND 1 [facility]   2/6/2025 10:30 AM Adelaida Lopez RN [facility]   2/6/2025 11:00 AM Tyler Kim MD [facility]   2/13/2025 11:00 AM Cathi Guadarrama, [facility]   2/27/2025 11:00 AM Cathi Guadarrama, PT [facility]

## 2025-02-05 SDOH — ECONOMIC STABILITY: TRANSPORTATION INSECURITY
IN THE PAST 12 MONTHS, HAS THE LACK OF TRANSPORTATION KEPT YOU FROM MEDICAL APPOINTMENTS OR FROM GETTING MEDICATIONS?: NO

## 2025-02-05 SDOH — ECONOMIC STABILITY: INCOME INSECURITY: IN THE LAST 12 MONTHS, WAS THERE A TIME WHEN YOU WERE NOT ABLE TO PAY THE MORTGAGE OR RENT ON TIME?: NO

## 2025-02-05 SDOH — ECONOMIC STABILITY: FOOD INSECURITY: WITHIN THE PAST 12 MONTHS, YOU WORRIED THAT YOUR FOOD WOULD RUN OUT BEFORE YOU GOT MONEY TO BUY MORE.: NEVER TRUE

## 2025-02-05 SDOH — ECONOMIC STABILITY: FOOD INSECURITY: WITHIN THE PAST 12 MONTHS, THE FOOD YOU BOUGHT JUST DIDN'T LAST AND YOU DIDN'T HAVE MONEY TO GET MORE.: NEVER TRUE

## 2025-02-06 ENCOUNTER — ROUTINE PRENATAL (OUTPATIENT)
Dept: OBGYN CLINIC | Age: 30
End: 2025-02-06

## 2025-02-06 VITALS
DIASTOLIC BLOOD PRESSURE: 77 MMHG | SYSTOLIC BLOOD PRESSURE: 116 MMHG | WEIGHT: 202.82 LBS | BODY MASS INDEX: 33.75 KG/M2

## 2025-02-06 DIAGNOSIS — O99.343 ANXIETY DURING PREGNANCY, ANTEPARTUM, THIRD TRIMESTER: ICD-10-CM

## 2025-02-06 DIAGNOSIS — O09.93 HIGH-RISK PREGNANCY IN THIRD TRIMESTER: ICD-10-CM

## 2025-02-06 DIAGNOSIS — F41.9 ANXIETY DURING PREGNANCY, ANTEPARTUM, THIRD TRIMESTER: ICD-10-CM

## 2025-02-06 DIAGNOSIS — O24.410 DIET CONTROLLED GESTATIONAL DIABETES MELLITUS (GDM) IN THIRD TRIMESTER: Primary | ICD-10-CM

## 2025-02-06 DIAGNOSIS — Z3A.36 36 WEEKS GESTATION OF PREGNANCY: ICD-10-CM

## 2025-02-06 RX ORDER — AZELAIC ACID 0.15 G/G
AEROSOL, FOAM TOPICAL
COMMUNITY

## 2025-02-06 RX ORDER — OMEPRAZOLE 40 MG/1
40 CAPSULE, DELAYED RELEASE ORAL
Qty: 30 CAPSULE | Refills: 5 | Status: SHIPPED | OUTPATIENT
Start: 2025-02-06

## 2025-02-06 RX ORDER — TRIAMCINOLONE ACETONIDE 1 MG/G
CREAM TOPICAL
COMMUNITY
Start: 2025-01-14

## 2025-02-06 RX ORDER — ONDANSETRON 4 MG/1
TABLET, FILM COATED ORAL
COMMUNITY
Start: 2024-07-16

## 2025-02-06 RX ORDER — HYDROXYZINE HYDROCHLORIDE 25 MG/1
TABLET, FILM COATED ORAL
COMMUNITY
Start: 2025-01-14 | End: 2025-02-06

## 2025-02-06 RX ORDER — SPIRONOLACTONE 50 MG/1
1 TABLET, FILM COATED ORAL DAILY
COMMUNITY
End: 2025-02-06

## 2025-02-06 ASSESSMENT — PATIENT HEALTH QUESTIONNAIRE - PHQ9
2. FEELING DOWN, DEPRESSED OR HOPELESS: NOT AT ALL
SUM OF ALL RESPONSES TO PHQ QUESTIONS 1-9: 0
SUM OF ALL RESPONSES TO PHQ QUESTIONS 1-9: 0
SUM OF ALL RESPONSES TO PHQ9 QUESTIONS 1 & 2: 0
SUM OF ALL RESPONSES TO PHQ QUESTIONS 1-9: 0
SUM OF ALL RESPONSES TO PHQ QUESTIONS 1-9: 0
1. LITTLE INTEREST OR PLEASURE IN DOING THINGS: NOT AT ALL

## 2025-02-06 NOTE — PROGRESS NOTES
[facility] MATERNAL FETAL MEDICINE    [address] [place] [address]  P- [phone]  R-[phone]     M Follow-up Visit  Caryn Mckee (1995) is a 29 y.o.  at 36w5d with 3/1/2025, by Last Menstrual Period.   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   present for visit.    Personal and family history reviewed and updated as indicated.     Records from pregnancy reviewed. Chart updated to portray current issues.      GDM:  Patient with recent diagnosis of gestational diabetes. Full details of current status in A/P section of problem list.      Pt is scheduled to see Primary OB ([facility]) on 2/10/25.     No recent HA's.  Denies Edema. Denies Pre-eclamptic symptoms. No bleeding or LOF.  No contractions or cramping. No vaginal pressure recently. Reports good fetal movement.       Mood evaluated today based on discussion with pt and PHQ screen.       2025    11:21 AM   PHQ-9    Little interest or pleasure in doing things 0   Feeling down, depressed, or hopeless 0   PHQ-2 Score 0   PHQ-9 Total Score 0      Mood Reassuring today  Recommend lifestyle/behavioral modifications to enhance mood (exercise, sunshine, mood apps, nutritional modifications, etc).   As mood stable and depression/anxiety well-controlled, will not begin medications today.    Reviewed gestational age precautions and activity goals/limitations  Nutritional counseling as well as specific goals based on current maternal and fetal status    Addressed normal pregnancy complaints, reassured and offered suggestions for care  Options for GERD, constipation, other common complaints reviewed    Reviewed gestational age appropriate preventive care regarding communicable disease transmission and vaccines as

## 2025-02-06 NOTE — PATIENT INSTRUCTIONS
Your Maternity Check List   Before Arriving to the Hospital     Find an OBGYN Doctor: [email or web address]  Maternity Pre-registration for Hospital: [email or web address]  Sign up for a Hospital Tour: [email or web address].[email or web address]/about-us/classes-events  [place] for Prenatal Classes: www.[email or web address]/about-us/classes-events   Car seat Safety Check: https://www.safekids.org/coalition/safe-kids-[facility]   Learn More: www.[employer]/health-care-services/womens-health/maternity   Download the [employer]® Pregnancy Pancho: (Scan QR Code below):  See educational videos, track your pregnancy, and Mom/Baby Care videos

## 2025-02-12 ENCOUNTER — TELEPHONE (OUTPATIENT)
Dept: OBGYN CLINIC | Age: 30
End: 2025-02-12

## 2025-02-12 NOTE — TELEPHONE ENCOUNTER
Left message on nurse VM line with [facility] providing information of delivery recommendations for pt of 39-41 weeks. Did this since pt was concerned her OB scheduled her induction at 39 weeks wo/discussing it with her.     Pt notified of recommendations and to follow-up with her OB.

## 2025-02-13 ENCOUNTER — HOSPITAL ENCOUNTER (OUTPATIENT)
Dept: PHYSICAL THERAPY | Age: 30
Setting detail: RECURRING SERIES
Discharge: HOME | End: 2025-02-16
Payer: COMMERCIAL

## 2025-02-13 PROCEDURE — 97110 THERAPEUTIC EXERCISES: CPT

## 2025-02-13 PROCEDURE — 97530 THERAPEUTIC ACTIVITIES: CPT

## 2025-02-13 PROCEDURE — 97140 MANUAL THERAPY 1/> REGIONS: CPT

## 2025-02-13 ASSESSMENT — PAIN SCALES - GENERAL: PAINLEVEL_OUTOF10: 0

## 2025-02-13 NOTE — PROGRESS NOTES
Caryn Mckee  : 1995  Primary: [facility] (Commercial)  Secondary:  [facility] @ [address]  [facility] [address]  Phone: [phone]  Fax: [phone] Plan Frequency: 1x/week for 90 days  Plan of Care/Certification Expiration Date: 25        Plan of Care/Certification Expiration Date:  Plan of Care/Certification Expiration Date: 25    Frequency/Duration: Plan Frequency: 1x/week for 90 days      Time In/Out:   Time In: 1105  Time Out: 1159     PT Visit Info:    Progress Note Due Date: 25  Total # of Visits to Date: 6      Visit Count:  6    OUTPATIENT PHYSICAL THERAPY:   Treatment Note 2025       Episode  (PFPT)               Treatment Diagnosis:    Other lack of coordination  Stress incontinence (female) (male)  Contributing Diagnosis:  Dyspareunia (N94.1), Frequency of micturition (R35.0), Pelvic and perineal pain (R10.2), and Pelvic floor dysfunction in female (M62.98)  Medical/Referring Diagnosis:    Other specified diseases and conditions complicating pregnancy [O99.891]    Referring Physician:  Shasha Isidro, APRN - NP  MD Orders:  PT Eval and Treat   Return MD Appt: seecarlie MOORE on 25  Date of Onset:  Onset Date: 24 (approximate date)     Allergies:   Patient has no known allergies.  Restrictions/Precautions:   None      Interventions Planned (Treatment may consist of any combination of the following):     See Assessment Note    Subjective Comments:   Pregnant, due 3/2/25, pelvic pain (pubic bone- worse with sitting), dyspareunia    37 weeks, 5 days  Leakage is slightly better  Pain only happens first time she wakes up at night, does pull in TrA    No perineal massage recently, plan to this weekend  Control over urinary urgency is much improved  Only waking up 1x/night to urinate (3-4am)  HEP: has been trying to stretch at night, has been doing the bird dog  TrA activation is helpful for

## 2025-02-22 ENCOUNTER — HOSPITAL ENCOUNTER (OUTPATIENT)
Age: 30
Discharge: HOME | End: 2025-02-23
Attending: OBSTETRICS & GYNECOLOGY | Admitting: OBSTETRICS & GYNECOLOGY
Payer: COMMERCIAL

## 2025-02-22 PROBLEM — Z3A.39 39 WEEKS GESTATION OF PREGNANCY: Status: ACTIVE | Noted: 2025-02-22

## 2025-02-22 LAB
ERYTHROCYTE [DISTWIDTH] IN BLOOD BY AUTOMATED COUNT: 12.2 % (ref 11.9–14.6)
HCT VFR BLD AUTO: 34.1 % (ref 35.8–46.3)
HGB BLD-MCNC: 11.1 G/DL (ref 11.7–15.4)
MCH RBC QN AUTO: 27.7 PG (ref 26.1–32.9)
MCHC RBC AUTO-ENTMCNC: 32.6 G/DL (ref 31.4–35)
MCV RBC AUTO: 85 FL (ref 82–102)
NRBC # BLD: 0 K/UL (ref 0–0.2)
PLATELET # BLD AUTO: 167 K/UL (ref 150–450)
PMV BLD AUTO: 12.6 FL (ref 9.4–12.3)
RBC # BLD AUTO: 4.01 M/UL (ref 4.05–5.2)
WBC # BLD AUTO: 9.5 K/UL (ref 4.3–11.1)

## 2025-02-22 PROCEDURE — 83615 LACTATE (LD) (LDH) ENZYME: CPT

## 2025-02-22 PROCEDURE — 85027 COMPLETE CBC AUTOMATED: CPT

## 2025-02-22 PROCEDURE — 84550 ASSAY OF BLOOD/URIC ACID: CPT

## 2025-02-22 PROCEDURE — 80053 COMPREHEN METABOLIC PANEL: CPT

## 2025-02-23 ENCOUNTER — ANESTHESIA (OUTPATIENT)
Dept: OPERATING ROOM | Age: 30
End: 2025-02-23
Payer: COMMERCIAL

## 2025-02-23 ENCOUNTER — HOSPITAL ENCOUNTER (INPATIENT)
Age: 30
LOS: 3 days | Discharge: HOME | End: 2025-02-26
Attending: OBSTETRICS & GYNECOLOGY | Admitting: OBSTETRICS & GYNECOLOGY
Payer: COMMERCIAL

## 2025-02-23 ENCOUNTER — ANESTHESIA EVENT (OUTPATIENT)
Dept: OPERATING ROOM | Age: 30
End: 2025-02-23
Payer: COMMERCIAL

## 2025-02-23 VITALS
BODY MASS INDEX: 34.49 KG/M2 | HEIGHT: 64 IN | HEART RATE: 93 BPM | SYSTOLIC BLOOD PRESSURE: 130 MMHG | TEMPERATURE: 97.9 F | RESPIRATION RATE: 18 BRPM | DIASTOLIC BLOOD PRESSURE: 79 MMHG | OXYGEN SATURATION: 99 % | WEIGHT: 202 LBS

## 2025-02-23 DIAGNOSIS — Z3A.39 39 WEEKS GESTATION OF PREGNANCY: Primary | ICD-10-CM

## 2025-02-23 LAB
ABO + RH BLD: NORMAL
ALBUMIN SERPL-MCNC: 2.6 G/DL (ref 3.5–5)
ALBUMIN/GLOB SERPL: 0.7 (ref 1–1.9)
ALP SERPL-CCNC: 212 U/L (ref 35–104)
ALT SERPL-CCNC: 13 U/L (ref 8–45)
ANION GAP SERPL CALC-SCNC: 11 MMOL/L (ref 7–16)
AST SERPL-CCNC: 17 U/L (ref 15–37)
BILIRUB SERPL-MCNC: <0.2 MG/DL (ref 0–1.2)
BLOOD GROUP ANTIBODIES SERPL: NORMAL
BUN SERPL-MCNC: 12 MG/DL (ref 6–23)
CALCIUM SERPL-MCNC: 9 MG/DL (ref 8.8–10.2)
CHLORIDE SERPL-SCNC: 107 MMOL/L (ref 98–107)
CO2 SERPL-SCNC: 19 MMOL/L (ref 20–29)
CREAT SERPL-MCNC: 0.5 MG/DL (ref 0.6–1.1)
ERYTHROCYTE [DISTWIDTH] IN BLOOD BY AUTOMATED COUNT: 12.2 % (ref 11.9–14.6)
GLOBULIN SER CALC-MCNC: 3.5 G/DL (ref 2.3–3.5)
GLUCOSE BLD STRIP.AUTO-MCNC: 114 MG/DL (ref 65–100)
GLUCOSE BLD STRIP.AUTO-MCNC: 114 MG/DL (ref 65–100)
GLUCOSE BLD STRIP.AUTO-MCNC: 78 MG/DL (ref 65–100)
GLUCOSE BLD STRIP.AUTO-MCNC: 84 MG/DL (ref 65–100)
GLUCOSE SERPL-MCNC: 101 MG/DL (ref 70–99)
HCT VFR BLD AUTO: 33.9 % (ref 35.8–46.3)
HGB BLD-MCNC: 11 G/DL (ref 11.7–15.4)
LDH SERPL L TO P-CCNC: 164 U/L (ref 127–281)
MCH RBC QN AUTO: 27.5 PG (ref 26.1–32.9)
MCHC RBC AUTO-ENTMCNC: 32.4 G/DL (ref 31.4–35)
MCV RBC AUTO: 84.8 FL (ref 82–102)
NRBC # BLD: 0 K/UL (ref 0–0.2)
PLATELET # BLD AUTO: 162 K/UL (ref 150–450)
PMV BLD AUTO: 12.5 FL (ref 9.4–12.3)
POTASSIUM SERPL-SCNC: 3.9 MMOL/L (ref 3.5–5.1)
PROT SERPL-MCNC: 6.1 G/DL (ref 6.3–8.2)
RBC # BLD AUTO: 4 M/UL (ref 4.05–5.2)
SERVICE CMNT-IMP: ABNORMAL
SERVICE CMNT-IMP: ABNORMAL
SERVICE CMNT-IMP: NORMAL
SERVICE CMNT-IMP: NORMAL
SODIUM SERPL-SCNC: 137 MMOL/L (ref 136–145)
SPECIMEN EXP DATE BLD: NORMAL
T PALLIDUM AB SER QL IA: NONREACTIVE
URATE SERPL-MCNC: 3.3 MG/DL (ref 2.5–7.1)
WBC # BLD AUTO: 11.8 K/UL (ref 4.3–11.1)

## 2025-02-23 PROCEDURE — 6360000002 HC RX W HCPCS: Performed by: OBSTETRICS & GYNECOLOGY

## 2025-02-23 PROCEDURE — 2500000003 HC RX 250 WO HCPCS: Performed by: OBSTETRICS & GYNECOLOGY

## 2025-02-23 PROCEDURE — 82962 GLUCOSE BLOOD TEST: CPT

## 2025-02-23 PROCEDURE — 2709999900 HC NON-CHARGEABLE SUPPLY: Performed by: OBSTETRICS & GYNECOLOGY

## 2025-02-23 PROCEDURE — 6370000000 HC RX 637 (ALT 250 FOR IP): Performed by: STUDENT IN AN ORGANIZED HEALTH CARE EDUCATION/TRAINING PROGRAM

## 2025-02-23 PROCEDURE — 51702 INSERT TEMP BLADDER CATH: CPT

## 2025-02-23 PROCEDURE — 3700000000 HC ANESTHESIA ATTENDED CARE: Performed by: OBSTETRICS & GYNECOLOGY

## 2025-02-23 PROCEDURE — 7210000100 HC LABOR FEE PER 1 HR

## 2025-02-23 PROCEDURE — 36415 COLL VENOUS BLD VENIPUNCTURE: CPT

## 2025-02-23 PROCEDURE — 86780 TREPONEMA PALLIDUM: CPT

## 2025-02-23 PROCEDURE — 7100000000 HC PACU RECOVERY - FIRST 15 MIN: Performed by: OBSTETRICS & GYNECOLOGY

## 2025-02-23 PROCEDURE — 6360000002 HC RX W HCPCS: Performed by: NURSE ANESTHETIST, CERTIFIED REGISTERED

## 2025-02-23 PROCEDURE — 2580000003 HC RX 258: Performed by: NURSE ANESTHETIST, CERTIFIED REGISTERED

## 2025-02-23 PROCEDURE — 7100000001 HC PACU RECOVERY - ADDTL 15 MIN: Performed by: OBSTETRICS & GYNECOLOGY

## 2025-02-23 PROCEDURE — 6370000000 HC RX 637 (ALT 250 FOR IP): Performed by: OBSTETRICS & GYNECOLOGY

## 2025-02-23 PROCEDURE — 3E033VJ INTRODUCTION OF OTHER HORMONE INTO PERIPHERAL VEIN, PERCUTANEOUS APPROACH: ICD-10-PCS | Performed by: OBSTETRICS & GYNECOLOGY

## 2025-02-23 PROCEDURE — 99285 EMERGENCY DEPT VISIT HI MDM: CPT

## 2025-02-23 PROCEDURE — 85027 COMPLETE CBC AUTOMATED: CPT

## 2025-02-23 PROCEDURE — 86850 RBC ANTIBODY SCREEN: CPT

## 2025-02-23 PROCEDURE — 2580000003 HC RX 258: Performed by: OBSTETRICS & GYNECOLOGY

## 2025-02-23 PROCEDURE — 1100000000 HC RM PRIVATE

## 2025-02-23 PROCEDURE — 86900 BLOOD TYPING SEROLOGIC ABO: CPT

## 2025-02-23 PROCEDURE — 86901 BLOOD TYPING SEROLOGIC RH(D): CPT

## 2025-02-23 PROCEDURE — 3700000001 HC ADD 15 MINUTES (ANESTHESIA): Performed by: OBSTETRICS & GYNECOLOGY

## 2025-02-23 PROCEDURE — 3609079900 HC CESAREAN SECTION: Performed by: OBSTETRICS & GYNECOLOGY

## 2025-02-23 RX ORDER — ACETAMINOPHEN 325 MG/1
650 TABLET ORAL EVERY 6 HOURS PRN
Status: DISPENSED | OUTPATIENT
Start: 2025-02-23 | End: 2025-02-24

## 2025-02-23 RX ORDER — SODIUM CHLORIDE, SODIUM LACTATE, POTASSIUM CHLORIDE, CALCIUM CHLORIDE 600; 310; 30; 20 MG/100ML; MG/100ML; MG/100ML; MG/100ML
INJECTION, SOLUTION INTRAVENOUS CONTINUOUS
OUTPATIENT
Start: 2025-02-23

## 2025-02-23 RX ORDER — OXYCODONE HYDROCHLORIDE 5 MG/1
5 TABLET ORAL EVERY 4 HOURS PRN
Status: DISPENSED | OUTPATIENT
Start: 2025-02-23 | End: 2025-02-24

## 2025-02-23 RX ORDER — IBUPROFEN 800 MG/1
800 TABLET, FILM COATED ORAL EVERY 6 HOURS
Status: DISCONTINUED | OUTPATIENT
Start: 2025-02-25 | End: 2025-02-26 | Stop reason: HOSPADM

## 2025-02-23 RX ORDER — SODIUM CHLORIDE 9 MG/ML
INJECTION, SOLUTION INTRAVENOUS PRN
Status: DISCONTINUED | OUTPATIENT
Start: 2025-02-23 | End: 2025-02-25

## 2025-02-23 RX ORDER — KETOROLAC TROMETHAMINE 30 MG/ML
30 INJECTION, SOLUTION INTRAMUSCULAR; INTRAVENOUS EVERY 6 HOURS PRN
Status: DISPENSED | OUTPATIENT
Start: 2025-02-23 | End: 2025-02-24

## 2025-02-23 RX ORDER — ACETAMINOPHEN 500 MG
1000 TABLET ORAL EVERY 6 HOURS
Status: DISCONTINUED | OUTPATIENT
Start: 2025-02-25 | End: 2025-02-26 | Stop reason: HOSPADM

## 2025-02-23 RX ORDER — MORPHINE SULFATE 4 MG/ML
4 INJECTION, SOLUTION INTRAMUSCULAR; INTRAVENOUS ONCE
Status: COMPLETED | OUTPATIENT
Start: 2025-02-23 | End: 2025-02-23

## 2025-02-23 RX ORDER — SODIUM CHLORIDE 0.9 % (FLUSH) 0.9 %
5-40 SYRINGE (ML) INJECTION EVERY 12 HOURS SCHEDULED
Status: DISCONTINUED | OUTPATIENT
Start: 2025-02-24 | End: 2025-02-25

## 2025-02-23 RX ORDER — PRENATAL VIT/IRON FUM/FOLIC AC 27MG-0.8MG
1 TABLET ORAL DAILY
Status: DISCONTINUED | OUTPATIENT
Start: 2025-02-24 | End: 2025-02-26 | Stop reason: HOSPADM

## 2025-02-23 RX ORDER — ONDANSETRON 2 MG/ML
4 INJECTION INTRAMUSCULAR; INTRAVENOUS EVERY 6 HOURS PRN
Status: DISCONTINUED | OUTPATIENT
Start: 2025-02-23 | End: 2025-02-25

## 2025-02-23 RX ORDER — LIDOCAINE HYDROCHLORIDE 10 MG/ML
30 INJECTION, SOLUTION INFILTRATION; PERINEURAL PRN
Status: DISCONTINUED | OUTPATIENT
Start: 2025-02-23 | End: 2025-02-24

## 2025-02-23 RX ORDER — TRANEXAMIC ACID 10 MG/ML
1000 INJECTION, SOLUTION INTRAVENOUS
Status: DISCONTINUED | OUTPATIENT
Start: 2025-02-23 | End: 2025-02-24

## 2025-02-23 RX ORDER — FAMOTIDINE 20 MG/1
20 TABLET, FILM COATED ORAL 2 TIMES DAILY
Status: DISCONTINUED | OUTPATIENT
Start: 2025-02-24 | End: 2025-02-26 | Stop reason: HOSPADM

## 2025-02-23 RX ORDER — DIPHENHYDRAMINE HYDROCHLORIDE 50 MG/ML
12.5 INJECTION INTRAMUSCULAR; INTRAVENOUS EVERY 6 HOURS PRN
Status: ACTIVE | OUTPATIENT
Start: 2025-02-23 | End: 2025-02-24

## 2025-02-23 RX ORDER — SODIUM CHLORIDE, SODIUM LACTATE, POTASSIUM CHLORIDE, CALCIUM CHLORIDE 600; 310; 30; 20 MG/100ML; MG/100ML; MG/100ML; MG/100ML
INJECTION, SOLUTION INTRAVENOUS CONTINUOUS
Status: DISCONTINUED | OUTPATIENT
Start: 2025-02-24 | End: 2025-02-25

## 2025-02-23 RX ORDER — ONDANSETRON 2 MG/ML
4 INJECTION INTRAMUSCULAR; INTRAVENOUS EVERY 6 HOURS PRN
OUTPATIENT
Start: 2025-02-23

## 2025-02-23 RX ORDER — OXYCODONE HYDROCHLORIDE 5 MG/1
5 TABLET ORAL EVERY 4 HOURS PRN
Status: DISCONTINUED | OUTPATIENT
Start: 2025-02-23 | End: 2025-02-26 | Stop reason: HOSPADM

## 2025-02-23 RX ORDER — DIPHENHYDRAMINE HYDROCHLORIDE 50 MG/ML
50 INJECTION INTRAMUSCULAR; INTRAVENOUS ONCE
Status: COMPLETED | OUTPATIENT
Start: 2025-02-23 | End: 2025-02-23

## 2025-02-23 RX ORDER — SODIUM CHLORIDE 0.9 % (FLUSH) 0.9 %
5-40 SYRINGE (ML) INJECTION EVERY 12 HOURS SCHEDULED
Status: DISCONTINUED | OUTPATIENT
Start: 2025-02-23 | End: 2025-02-23 | Stop reason: SDUPTHER

## 2025-02-23 RX ORDER — SODIUM CHLORIDE, SODIUM LACTATE, POTASSIUM CHLORIDE, AND CALCIUM CHLORIDE .6; .31; .03; .02 G/100ML; G/100ML; G/100ML; G/100ML
500 INJECTION, SOLUTION INTRAVENOUS PRN
Status: DISCONTINUED | OUTPATIENT
Start: 2025-02-23 | End: 2025-02-24

## 2025-02-23 RX ORDER — OXYCODONE HYDROCHLORIDE 5 MG/1
10 TABLET ORAL EVERY 4 HOURS PRN
Status: DISCONTINUED | OUTPATIENT
Start: 2025-02-23 | End: 2025-02-26 | Stop reason: HOSPADM

## 2025-02-23 RX ORDER — NALOXONE HYDROCHLORIDE 0.4 MG/ML
INJECTION, SOLUTION INTRAMUSCULAR; INTRAVENOUS; SUBCUTANEOUS PRN
Status: ACTIVE | OUTPATIENT
Start: 2025-02-23 | End: 2025-02-24

## 2025-02-23 RX ORDER — BUTORPHANOL TARTRATE 2 MG/ML
1 INJECTION, SOLUTION INTRAMUSCULAR; INTRAVENOUS
Status: DISCONTINUED | OUTPATIENT
Start: 2025-02-23 | End: 2025-02-23 | Stop reason: SDUPTHER

## 2025-02-23 RX ORDER — SIMETHICONE 80 MG
80 TABLET,CHEWABLE ORAL EVERY 6 HOURS PRN
Status: DISCONTINUED | OUTPATIENT
Start: 2025-02-23 | End: 2025-02-26 | Stop reason: HOSPADM

## 2025-02-23 RX ORDER — ROPIVACAINE HYDROCHLORIDE 2 MG/ML
INJECTION, SOLUTION EPIDURAL; INFILTRATION; PERINEURAL
Status: DISCONTINUED | OUTPATIENT
Start: 2025-02-23 | End: 2025-02-23 | Stop reason: SDUPTHER

## 2025-02-23 RX ORDER — POLYETHYLENE GLYCOL 3350 17 G/17G
17 POWDER, FOR SOLUTION ORAL DAILY
Status: DISCONTINUED | OUTPATIENT
Start: 2025-02-24 | End: 2025-02-26 | Stop reason: HOSPADM

## 2025-02-23 RX ORDER — DOCUSATE SODIUM 100 MG/1
100 CAPSULE, LIQUID FILLED ORAL 2 TIMES DAILY
Status: DISCONTINUED | OUTPATIENT
Start: 2025-02-24 | End: 2025-02-26 | Stop reason: HOSPADM

## 2025-02-23 RX ORDER — LIDOCAINE HCL/EPINEPHRINE/PF 2%-1:200K
VIAL (ML) INJECTION
Status: DISCONTINUED | OUTPATIENT
Start: 2025-02-23 | End: 2025-02-23 | Stop reason: SDUPTHER

## 2025-02-23 RX ORDER — SODIUM CHLORIDE, SODIUM LACTATE, POTASSIUM CHLORIDE, CALCIUM CHLORIDE 600; 310; 30; 20 MG/100ML; MG/100ML; MG/100ML; MG/100ML
INJECTION, SOLUTION INTRAVENOUS
Status: DISCONTINUED | OUTPATIENT
Start: 2025-02-23 | End: 2025-02-23 | Stop reason: SDUPTHER

## 2025-02-23 RX ORDER — ONDANSETRON 4 MG/1
4 TABLET, ORALLY DISINTEGRATING ORAL EVERY 8 HOURS PRN
Status: DISCONTINUED | OUTPATIENT
Start: 2025-02-23 | End: 2025-02-26 | Stop reason: HOSPADM

## 2025-02-23 RX ORDER — MISOPROSTOL 200 UG/1
400 TABLET ORAL PRN
Status: DISCONTINUED | OUTPATIENT
Start: 2025-02-23 | End: 2025-02-24

## 2025-02-23 RX ORDER — MIDAZOLAM HYDROCHLORIDE 1 MG/ML
INJECTION, SOLUTION INTRAMUSCULAR; INTRAVENOUS
Status: DISCONTINUED | OUTPATIENT
Start: 2025-02-23 | End: 2025-02-23 | Stop reason: SDUPTHER

## 2025-02-23 RX ORDER — ACETAMINOPHEN 325 MG/1
650 TABLET ORAL EVERY 4 HOURS PRN
Status: DISCONTINUED | OUTPATIENT
Start: 2025-02-23 | End: 2025-02-23 | Stop reason: SDUPTHER

## 2025-02-23 RX ORDER — METHYLERGONOVINE MALEATE 0.2 MG/ML
200 INJECTION INTRAVENOUS PRN
Status: DISCONTINUED | OUTPATIENT
Start: 2025-02-23 | End: 2025-02-24

## 2025-02-23 RX ORDER — SODIUM CHLORIDE 9 MG/ML
25 INJECTION, SOLUTION INTRAVENOUS PRN
Status: DISCONTINUED | OUTPATIENT
Start: 2025-02-23 | End: 2025-02-23 | Stop reason: SDUPTHER

## 2025-02-23 RX ORDER — DIPHENHYDRAMINE HCL 25 MG
25 CAPSULE ORAL EVERY 6 HOURS PRN
Status: DISPENSED | OUTPATIENT
Start: 2025-02-23 | End: 2025-02-24

## 2025-02-23 RX ORDER — SODIUM CHLORIDE, SODIUM LACTATE, POTASSIUM CHLORIDE, AND CALCIUM CHLORIDE .6; .31; .03; .02 G/100ML; G/100ML; G/100ML; G/100ML
75 INJECTION, SOLUTION INTRAVENOUS ONCE
Status: DISCONTINUED | OUTPATIENT
Start: 2025-02-23 | End: 2025-02-23

## 2025-02-23 RX ORDER — SODIUM CHLORIDE, SODIUM LACTATE, POTASSIUM CHLORIDE, CALCIUM CHLORIDE 600; 310; 30; 20 MG/100ML; MG/100ML; MG/100ML; MG/100ML
INJECTION, SOLUTION INTRAVENOUS CONTINUOUS
Status: DISCONTINUED | OUTPATIENT
Start: 2025-02-23 | End: 2025-02-24

## 2025-02-23 RX ORDER — ONDANSETRON 2 MG/ML
4 INJECTION INTRAMUSCULAR; INTRAVENOUS EVERY 6 HOURS PRN
Status: DISCONTINUED | OUTPATIENT
Start: 2025-02-23 | End: 2025-02-23 | Stop reason: SDUPTHER

## 2025-02-23 RX ORDER — SODIUM CHLORIDE 0.9 % (FLUSH) 0.9 %
5-40 SYRINGE (ML) INJECTION PRN
Status: DISCONTINUED | OUTPATIENT
Start: 2025-02-23 | End: 2025-02-23 | Stop reason: SDUPTHER

## 2025-02-23 RX ORDER — SODIUM CHLORIDE 0.9 % (FLUSH) 0.9 %
5-40 SYRINGE (ML) INJECTION PRN
Status: DISCONTINUED | OUTPATIENT
Start: 2025-02-23 | End: 2025-02-25

## 2025-02-23 RX ORDER — FENTANYL CITRATE 50 UG/ML
INJECTION, SOLUTION INTRAMUSCULAR; INTRAVENOUS
Status: DISCONTINUED | OUTPATIENT
Start: 2025-02-23 | End: 2025-02-23 | Stop reason: SDUPTHER

## 2025-02-23 RX ORDER — HYDROMORPHONE HYDROCHLORIDE 1 MG/ML
0.5 INJECTION, SOLUTION INTRAMUSCULAR; INTRAVENOUS; SUBCUTANEOUS
Status: ACTIVE | OUTPATIENT
Start: 2025-02-23 | End: 2025-02-24

## 2025-02-23 RX ORDER — LANOLIN
CREAM (ML) TOPICAL
Status: DISCONTINUED | OUTPATIENT
Start: 2025-02-23 | End: 2025-02-26 | Stop reason: HOSPADM

## 2025-02-23 RX ORDER — OXYCODONE HYDROCHLORIDE 5 MG/1
10 TABLET ORAL EVERY 4 HOURS PRN
Status: DISPENSED | OUTPATIENT
Start: 2025-02-23 | End: 2025-02-24

## 2025-02-23 RX ORDER — CITRIC ACID/SODIUM CITRATE 334-500MG
30 SOLUTION, ORAL ORAL ONCE
Status: COMPLETED | OUTPATIENT
Start: 2025-02-23 | End: 2025-02-23

## 2025-02-23 RX ORDER — ONDANSETRON 4 MG/1
4 TABLET, ORALLY DISINTEGRATING ORAL EVERY 6 HOURS PRN
OUTPATIENT
Start: 2025-02-23

## 2025-02-23 RX ORDER — TERBUTALINE SULFATE 1 MG/ML
0.25 INJECTION SUBCUTANEOUS
Status: DISCONTINUED | OUTPATIENT
Start: 2025-02-23 | End: 2025-02-24

## 2025-02-23 RX ORDER — MORPHINE SULFATE 0.5 MG/ML
INJECTION, SOLUTION EPIDURAL; INTRATHECAL; INTRAVENOUS
Status: DISCONTINUED | OUTPATIENT
Start: 2025-02-23 | End: 2025-02-23 | Stop reason: SDUPTHER

## 2025-02-23 RX ORDER — KETOROLAC TROMETHAMINE 30 MG/ML
INJECTION, SOLUTION INTRAMUSCULAR; INTRAVENOUS
Status: DISCONTINUED | OUTPATIENT
Start: 2025-02-23 | End: 2025-02-23 | Stop reason: SDUPTHER

## 2025-02-23 RX ORDER — CARBOPROST TROMETHAMINE 250 UG/ML
250 INJECTION, SOLUTION INTRAMUSCULAR PRN
Status: DISCONTINUED | OUTPATIENT
Start: 2025-02-23 | End: 2025-02-24

## 2025-02-23 RX ADMIN — MIDAZOLAM 2 MG: 1 INJECTION INTRAMUSCULAR; INTRAVENOUS at 23:02

## 2025-02-23 RX ADMIN — SODIUM CHLORIDE, POTASSIUM CHLORIDE, SODIUM LACTATE AND CALCIUM CHLORIDE: 600; 310; 30; 20 INJECTION, SOLUTION INTRAVENOUS at 09:49

## 2025-02-23 RX ADMIN — ROPIVACAINE HYDROCHLORIDE 8 ML: 2 INJECTION, SOLUTION EPIDURAL; INFILTRATION at 08:31

## 2025-02-23 RX ADMIN — SODIUM CHLORIDE, SODIUM LACTATE, POTASSIUM CHLORIDE, AND CALCIUM CHLORIDE: 600; 310; 30; 20 INJECTION, SOLUTION INTRAVENOUS at 22:33

## 2025-02-23 RX ADMIN — CEFAZOLIN 2000 MG: 10 INJECTION, POWDER, FOR SOLUTION INTRAVENOUS at 22:26

## 2025-02-23 RX ADMIN — PENICILLIN G POTASSIUM 5 MILLION UNITS: 5000000 INJECTION, POWDER, FOR SOLUTION INTRAMUSCULAR; INTRAVENOUS at 16:51

## 2025-02-23 RX ADMIN — KETOROLAC TROMETHAMINE 30 MG: 30 INJECTION, SOLUTION INTRAMUSCULAR; INTRAVENOUS at 23:13

## 2025-02-23 RX ADMIN — LIDOCAINE HYDROCHLORIDE AND EPINEPHRINE 5 ML: 20; 5 INJECTION, SOLUTION EPIDURAL; INFILTRATION; INTRACAUDAL; PERINEURAL at 22:28

## 2025-02-23 RX ADMIN — MORPHINE SULFATE 5 MG: 0.5 INJECTION, SOLUTION EPIDURAL; INTRATHECAL; INTRAVENOUS at 23:02

## 2025-02-23 RX ADMIN — OXYTOCIN 2 MILLI-UNITS/MIN: 10 INJECTION, SOLUTION INTRAMUSCULAR; INTRAVENOUS at 09:49

## 2025-02-23 RX ADMIN — ACETAMINOPHEN 650 MG: 325 TABLET, FILM COATED ORAL at 19:04

## 2025-02-23 RX ADMIN — FENTANYL CITRATE 50 MCG: 50 INJECTION, SOLUTION INTRAMUSCULAR; INTRAVENOUS at 23:16

## 2025-02-23 RX ADMIN — LIDOCAINE HYDROCHLORIDE AND EPINEPHRINE 5 ML: 20; 5 INJECTION, SOLUTION EPIDURAL; INFILTRATION; INTRACAUDAL; PERINEURAL at 23:13

## 2025-02-23 RX ADMIN — ONDANSETRON 4 MG: 2 INJECTION, SOLUTION INTRAMUSCULAR; INTRAVENOUS at 22:24

## 2025-02-23 RX ADMIN — FENTANYL CITRATE 50 MCG: 50 INJECTION, SOLUTION INTRAMUSCULAR; INTRAVENOUS at 23:20

## 2025-02-23 RX ADMIN — ROPIVACAINE HYDROCHLORIDE 8 ML/HR: 2 INJECTION, SOLUTION EPIDURAL; INFILTRATION at 08:33

## 2025-02-23 RX ADMIN — ONDANSETRON 4 MG: 2 INJECTION, SOLUTION INTRAMUSCULAR; INTRAVENOUS at 09:46

## 2025-02-23 RX ADMIN — Medication 500 ML/HR: at 22:58

## 2025-02-23 RX ADMIN — SODIUM CITRATE AND CITRIC ACID MONOHYDRATE 30 ML: 2004; 3000 SOLUTION ORAL at 22:24

## 2025-02-23 RX ADMIN — LIDOCAINE HYDROCHLORIDE AND EPINEPHRINE 5 ML: 20; 5 INJECTION, SOLUTION EPIDURAL; INFILTRATION; INTRACAUDAL; PERINEURAL at 22:49

## 2025-02-23 RX ADMIN — SODIUM CHLORIDE 2.5 MILLION UNITS: 9 INJECTION, SOLUTION INTRAVENOUS at 20:48

## 2025-02-23 RX ADMIN — ACETAMINOPHEN 650 MG: 325 TABLET, FILM COATED ORAL at 15:19

## 2025-02-23 RX ADMIN — DIPHENHYDRAMINE HYDROCHLORIDE 50 MG: 50 INJECTION INTRAMUSCULAR; INTRAVENOUS at 19:51

## 2025-02-23 RX ADMIN — AZITHROMYCIN MONOHYDRATE 500 MG: 500 INJECTION, POWDER, LYOPHILIZED, FOR SOLUTION INTRAVENOUS at 22:25

## 2025-02-23 RX ADMIN — SODIUM CHLORIDE, POTASSIUM CHLORIDE, SODIUM LACTATE AND CALCIUM CHLORIDE 500 ML: 600; 310; 30; 20 INJECTION, SOLUTION INTRAVENOUS at 08:15

## 2025-02-23 RX ADMIN — LIDOCAINE HYDROCHLORIDE AND EPINEPHRINE 5 ML: 20; 5 INJECTION, SOLUTION EPIDURAL; INFILTRATION; INTRACAUDAL; PERINEURAL at 22:20

## 2025-02-23 RX ADMIN — MORPHINE SULFATE 4 MG: 4 INJECTION, SOLUTION INTRAMUSCULAR; INTRAVENOUS at 00:59

## 2025-02-23 ASSESSMENT — PAIN DESCRIPTION - LOCATION: LOCATION: ABDOMEN

## 2025-02-23 ASSESSMENT — PAIN SCALES - GENERAL: PAINLEVEL_OUTOF10: 7

## 2025-02-23 ASSESSMENT — PAIN DESCRIPTION - DESCRIPTORS: DESCRIPTORS: CRAMPING

## 2025-02-23 NOTE — H&P
Obstetrics History & Physical/OB ED note    Name: Caryn Mckee MRN: [id number]     YOB: 1995  Age: 29 y.o.  Sex: female      Reason for Presentation:  contractions/possible labor    HPI: Caryn Mckee is a 29 y.o.  female with Estimated Date of Delivery: 3/1/25 at 39w0d gestation. PT c/o contractions every 5-8 mins for the past 2 hours. Painful. No VB/LOF. Baby is active. No other c/o's today.    Her obstetrical history is significant for A1GDM, h/o anxiety not on meds.      Pt has an IOL scheduled for  PM 25.    Past History:  OB History    Para Term  AB Living   1             SAB IAB Ectopic Molar Multiple Live Births                    # Outcome Date GA Lbr Rosalio/2nd Weight Sex Type Anes PTL Lv   1 Current              Past Medical History:   Diagnosis Date    Anxiety     Cystic acne 2023    Gestational diabetes mellitus     IBS (irritable bowel syndrome)     Psychologic vaginismus 2023     No past surgical history on file.  Social History     Tobacco Use    Smoking status: Never    Smokeless tobacco: Never   Substance Use Topics    Alcohol use: Never     Prior to Admission medications    Medication Sig Start Date End Date Taking? Authorizing Provider   ondansetron (ZOFRAN) 4 MG tablet Take 2 tablets twice a day by oral route.  Patient not taking: Reported on 2025   Tena Lucio MD   triamcinolone (KENALOG) 0.1 % cream APPLY A THIN LAYER TOPICALLY TO THE AFFECTED AREA(S) TWICE A DAY AS NEEDED FOR ECZEMA . TO RASH ON LEGS 25   Tena Lucio MD   FINACEA 15 % FOAM APPLY A SMALL AMOUNT TOPICALLY TO THE AFFECTED AREA(S) TWO TIMES A DAY    Tena Lucio MD   omeprazole (PRILOSEC) 40 MG delayed release capsule Take 1 capsule by mouth every morning (before breakfast) 25   Tyler Kim MD   famotidine (PEPCID) 20 MG tablet Take 1 tablet twice a day by oral route for 90 days.    Provider

## 2025-02-23 NOTE — PROGRESS NOTES
Discharge instruction given. Information/teaching printout given to patient. States understanding. All questions answered. Informed pt to return to hospital for decreased fetal movement, if she suspects her water breaks, if vaginal bleeding occurs that is more than spotting, or she starts to experience painful regular contractions 3-5 minutes apart. Pt verbalizes understanding. Discussed importance of follow up with primary MD. Ambulate out to personal auto with FOB at side. Pt stable at discharge.

## 2025-02-23 NOTE — ANESTHESIA PRE PROCEDURE
Department of Anesthesiology  Preprocedure Note       Name:  Caryn Mckee   Age:  29 y.o.  :  1995                                          MRN:  [id number]         Date:  2025      Surgeon: * No surgeons listed *    Procedure: * No procedures listed *    Medications prior to admission:   Prior to Admission medications    Medication Sig Start Date End Date Taking? Authorizing Provider   ondansetron (ZOFRAN) 4 MG tablet Take 2 tablets twice a day by oral route.  Patient not taking: Reported on 2025   Tena Lucio MD   triamcinolone (KENALOG) 0.1 % cream APPLY A THIN LAYER TOPICALLY TO THE AFFECTED AREA(S) TWICE A DAY AS NEEDED FOR ECZEMA . TO RASH ON LEGS 25   Tena Lucio MD   FINACEA 15 % FOAM APPLY A SMALL AMOUNT TOPICALLY TO THE AFFECTED AREA(S) TWO TIMES A DAY    Tena Lucio MD   omeprazole (PRILOSEC) 40 MG delayed release capsule Take 1 capsule by mouth every morning (before breakfast) 25   Tyler Kim MD   famotidine (PEPCID) 20 MG tablet Take 1 tablet twice a day by oral route for 90 days.    Tena Lucio MD   Prenatal Vit-Fe Fumarate-FA (PRENATAL PO) Take by mouth    Tena Lucio MD   Omega-3 Fatty Acids (FISH OIL PO) Take by mouth    Tena Lucio MD   CHOLINE PO Take by mouth    Tena Lucio MD   diphenhydrAMINE HCl (BENADRYL ALLERGY PO) Take by mouth    Tena Lucio MD   Lancets (ONETOUCH DELICA PLUS [id number]) MISC     ProviderTena MD   Blood Glucose Monitoring Suppl (ONE TOUCH ULTRA 2) w/Device KIT     Tena Lucio MD   blood glucose test strips (ONETOUCH ULTRA TEST) strip Test up to 4 times daily 24   Sabine Acosta MD       Current medications:    Current Facility-Administered Medications   Medication Dose Route Frequency Provider Last Rate Last Admin    terbutaline (BRETHINE) injection 0.25 mg  0.25 mg SubCUTAneous Once PRN Austin Patrick DO

## 2025-02-23 NOTE — H&P
Obstetrics History & Physical/OB ED note    Name: Caryn Mckee MRN: [id number]     YOB: 1995  Age: 29 y.o.  Sex: female      Reason for Presentation:  contractions/possible labor    HPI: Caryn Mckee is a 29 y.o.  female with Estimated Date of Delivery: 3/1/25 at 39w1d gestation.  PT returns to triage with worsening contractions tonight.  Was seen here earlier for contractions, and discharged home not in labor. Was given one dose IM morphine 4mg. Pt reports she did not get much relief with it, just \"felt drowsy\" but the contractions have persisted.  No VB/LOF. Pt feels ready for an epidural.   Pt has IOL scheduled for this evening.    Her obstetrical history is significant for A1GDM, h/o anxiety not on meds .      Prenatal records reviewed,reviewed [facility] records.      Past History:  OB History    Para Term  AB Living   1             SAB IAB Ectopic Molar Multiple Live Births                    # Outcome Date GA Lbr Rosalio/2nd Weight Sex Type Anes PTL Lv   1 Current              Past Medical History:   Diagnosis Date    Anxiety     Cystic acne 2023    Gestational diabetes mellitus     IBS (irritable bowel syndrome)     Psychologic vaginismus 2023     Past Surgical History:   Procedure Laterality Date    WISDOM TOOTH EXTRACTION       Social History     Tobacco Use    Smoking status: Never    Smokeless tobacco: Never   Substance Use Topics    Alcohol use: Never     Prior to Admission medications    Medication Sig Start Date End Date Taking? Authorizing Provider   ondansetron (ZOFRAN) 4 MG tablet Take 2 tablets twice a day by oral route.  Patient not taking: Reported on 2025   ProviderTena MD   triamcinolone (KENALOG) 0.1 % cream APPLY A THIN LAYER TOPICALLY TO THE AFFECTED AREA(S) TWICE A DAY AS NEEDED FOR ECZEMA . TO RASH ON LEGS 25   Tena Lucio MD   FINACEA 15 % FOAM APPLY A SMALL AMOUNT TOPICALLY TO THE  pt c/o with nausea and vomiting since Fri

## 2025-02-23 NOTE — PROGRESS NOTES
Pt to room OBED2 for triage with chief complaint of Contractions. Assessment begins, EFM and [place] applied to a soft non tender abdomen and tracing well. Pt reports +FM, denies VB or LOF    Dr Patrick called to assess patient.

## 2025-02-24 ENCOUNTER — ANESTHESIA EVENT (OUTPATIENT)
Dept: MOTHER INFANT UNIT | Age: 30
End: 2025-02-24
Payer: COMMERCIAL

## 2025-02-24 ENCOUNTER — ANESTHESIA (OUTPATIENT)
Dept: MOTHER INFANT UNIT | Age: 30
End: 2025-02-24
Payer: COMMERCIAL

## 2025-02-24 LAB
BLOOD BANK CMNT PATIENT-IMP: NORMAL
ERYTHROCYTE [DISTWIDTH] IN BLOOD BY AUTOMATED COUNT: 12.4 % (ref 11.9–14.6)
FETAL SCREEN: NORMAL
GLUCOSE BLD STRIP.AUTO-MCNC: 100 MG/DL (ref 65–100)
GLUCOSE BLD STRIP.AUTO-MCNC: 161 MG/DL (ref 65–100)
HCT VFR BLD AUTO: 29.7 % (ref 35.8–46.3)
HGB BLD-MCNC: 9.5 G/DL (ref 11.7–15.4)
MCH RBC QN AUTO: 27.5 PG (ref 26.1–32.9)
MCHC RBC AUTO-ENTMCNC: 32 G/DL (ref 31.4–35)
MCV RBC AUTO: 86.1 FL (ref 82–102)
NRBC # BLD: 0 K/UL (ref 0–0.2)
PLATELET # BLD AUTO: 161 K/UL (ref 150–450)
PMV BLD AUTO: 12.3 FL (ref 9.4–12.3)
RBC # BLD AUTO: 3.45 M/UL (ref 4.05–5.2)
SERVICE CMNT-IMP: ABNORMAL
SERVICE CMNT-IMP: NORMAL
WBC # BLD AUTO: 16.2 K/UL (ref 4.3–11.1)

## 2025-02-24 PROCEDURE — 85461 HEMOGLOBIN FETAL: CPT

## 2025-02-24 PROCEDURE — 36415 COLL VENOUS BLD VENIPUNCTURE: CPT

## 2025-02-24 PROCEDURE — 6370000000 HC RX 637 (ALT 250 FOR IP): Performed by: STUDENT IN AN ORGANIZED HEALTH CARE EDUCATION/TRAINING PROGRAM

## 2025-02-24 PROCEDURE — 6360000002 HC RX W HCPCS: Performed by: STUDENT IN AN ORGANIZED HEALTH CARE EDUCATION/TRAINING PROGRAM

## 2025-02-24 PROCEDURE — 6370000000 HC RX 637 (ALT 250 FOR IP): Performed by: OBSTETRICS & GYNECOLOGY

## 2025-02-24 PROCEDURE — 82962 GLUCOSE BLOOD TEST: CPT

## 2025-02-24 PROCEDURE — 2580000003 HC RX 258: Performed by: OBSTETRICS & GYNECOLOGY

## 2025-02-24 PROCEDURE — 85027 COMPLETE CBC AUTOMATED: CPT

## 2025-02-24 PROCEDURE — 3E0234Z INTRODUCTION OF SERUM, TOXOID AND VACCINE INTO MUSCLE, PERCUTANEOUS APPROACH: ICD-10-PCS | Performed by: OBSTETRICS & GYNECOLOGY

## 2025-02-24 PROCEDURE — 1100000000 HC RM PRIVATE

## 2025-02-24 PROCEDURE — 6360000002 HC RX W HCPCS: Performed by: OBSTETRICS & GYNECOLOGY

## 2025-02-24 RX ADMIN — DOCUSATE SODIUM 100 MG: 100 CAPSULE, LIQUID FILLED ORAL at 09:40

## 2025-02-24 RX ADMIN — OXYCODONE 5 MG: 5 TABLET ORAL at 10:14

## 2025-02-24 RX ADMIN — KETOROLAC TROMETHAMINE 30 MG: 30 INJECTION, SOLUTION INTRAMUSCULAR at 23:27

## 2025-02-24 RX ADMIN — KETOROLAC TROMETHAMINE 30 MG: 30 INJECTION, SOLUTION INTRAMUSCULAR at 18:02

## 2025-02-24 RX ADMIN — DIPHENHYDRAMINE HYDROCHLORIDE 25 MG: 25 CAPSULE ORAL at 06:05

## 2025-02-24 RX ADMIN — KETOROLAC TROMETHAMINE 30 MG: 30 INJECTION, SOLUTION INTRAMUSCULAR at 06:05

## 2025-02-24 RX ADMIN — SODIUM CHLORIDE, POTASSIUM CHLORIDE, SODIUM LACTATE AND CALCIUM CHLORIDE: 600; 310; 30; 20 INJECTION, SOLUTION INTRAVENOUS at 02:03

## 2025-02-24 RX ADMIN — OXYCODONE 10 MG: 5 TABLET ORAL at 00:12

## 2025-02-24 RX ADMIN — ACETAMINOPHEN 650 MG: 325 TABLET, FILM COATED ORAL at 17:08

## 2025-02-24 RX ADMIN — RHO(D) IMMUNE GLOBULIN (HUMAN) 300 MCG: 1500 SOLUTION INTRAMUSCULAR at 17:09

## 2025-02-24 RX ADMIN — KETOROLAC TROMETHAMINE 30 MG: 30 INJECTION, SOLUTION INTRAMUSCULAR at 12:07

## 2025-02-24 RX ADMIN — OXYCODONE 5 MG: 5 TABLET ORAL at 18:03

## 2025-02-24 RX ADMIN — ACETAMINOPHEN 650 MG: 325 TABLET, FILM COATED ORAL at 09:25

## 2025-02-24 RX ADMIN — DOCUSATE SODIUM 100 MG: 100 CAPSULE, LIQUID FILLED ORAL at 21:20

## 2025-02-24 ASSESSMENT — PAIN DESCRIPTION - LOCATION
LOCATION: INCISION
LOCATION: ABDOMEN

## 2025-02-24 ASSESSMENT — PAIN SCALES - GENERAL
PAINLEVEL_OUTOF10: 6
PAINLEVEL_OUTOF10: 6
PAINLEVEL_OUTOF10: 7
PAINLEVEL_OUTOF10: 5
PAINLEVEL_OUTOF10: 2

## 2025-02-24 ASSESSMENT — PAIN DESCRIPTION - ORIENTATION
ORIENTATION: ANTERIOR
ORIENTATION: ANTERIOR
ORIENTATION: LOWER

## 2025-02-24 ASSESSMENT — PAIN DESCRIPTION - DESCRIPTORS
DESCRIPTORS: SORE;ACHING
DESCRIPTORS: SORE
DESCRIPTORS: SORE

## 2025-02-24 NOTE — LACTATION NOTE
This note was copied from a baby's chart.  In to see mom and infant for the first time. Infant was asleep in the crib. Non stated that infant latched and nursed well several times since birth. Reviewed the expectations of the first 24 hours as well as the second night. Answered questions and mom requested to be measured: Left 19 mm and right 18 mm. Lactation consultant will follow up tomorrow.

## 2025-02-24 NOTE — L&D DELIVERY NOTE
Xander Mckeeley [[id number]]      Labor Events     Labor: No   Steroids: None  Cervical Ripening Date/Time:      Antibiotics Received during Labor: No  Rupture Date/Time:  25 09:34:00   Rupture Type: AROM  Fluid Color: Clear  Fluid Odor: None  Fluid Volume: Moderate  Induction: None  Augmentation: AROM, Oxytocin  Labor Complications: None       Anesthesia    Method: Epidural       Labor Event Times      Labor onset date/time:  25 07:06:00     Dilation complete date/time:        Start pushing date/time:     Decision date/time (emergent ):            Delivery Details      Delivery Date: 25 Delivery Time: 22:57:00   Delivery Type: , Low Transverse  Trial of Labor?: Yes   Categorization: Primary   Priority: unscheduled  Indications for : Arrest of Descent   Other  Indications:  PROCEDURAL - OBSTETRIC - DELIVERY -  SECTION - INDICATIONS FOR  SECTION   chorio      Skin Incision Type: Pfannenstiel  Uterine Incision: Low Transverse       [place] Presentation    Presentation: Vertex  Position: Left  _: Occiput  _: Anterior       Shoulder Dystocia    Shoulder Dystocia Present?: No       Assisted Delivery Details    Forceps Attempted?: No  Vacuum Extractor Attempted?: No                           Cord    Vessels: 3 Vessels  Complications: Nuchal Loose  Cord Around: Head  Delayed Cord Clamping?: Yes  Cord Clamped Date/Time: 2025 22:58:00  Cord Blood Disposition: Lab  Gases Sent?: No   Cord Comments:  PROCEDURAL - OBSTETRIC - CORD OBSERVATION   nuchal X1             Placenta    Date/Time: 2025 22:59:00  Removal: Manual Removal  Appearance: Intact  Disposition: Discarded       Lacerations           Blood Loss  Mother: MckeeCamiloCaryn [place] #[id number]     Start of Mother's Information      Delivery Blood Loss   Intrapartum & Postpartum: 25 0706 - 25 2351    Delivery Admission: 25 0635 - 25

## 2025-02-24 NOTE — PROGRESS NOTES
Garcia catheter removed without difficulty. Patient up to bathroom with assistance. Pericare instructed. Patient unable to void at this time. Linen changed. Patient assisted back to bed. Denies needs. Instructed to call PRN.

## 2025-02-24 NOTE — PROGRESS NOTES
SVE: Ant lip on left, swollen but just received Benadryl  [place]: q3-7min  FHTs: 140-150s, + accels  P: Pit is only at 8, will increase again

## 2025-02-24 NOTE — PROGRESS NOTES
Dr. Alexis notified via telephone of pt's fasting sugar this AM-161 & that pt states she ate a snack at 2 AM. Message left, no new orders at this time.

## 2025-02-25 LAB
C. TRACHOMATIS, EXTERNAL RESULT: NOT DETECTED
GBS, EXTERNAL RESULT: NEGATIVE
GLUCOSE BLD STRIP.AUTO-MCNC: 109 MG/DL (ref 65–100)
GLUCOSE BLD STRIP.AUTO-MCNC: 120 MG/DL (ref 65–100)
GLUCOSE BLD STRIP.AUTO-MCNC: 139 MG/DL (ref 65–100)
HEP B, EXTERNAL RESULT: NON REACTIVE
HEPATITIS C ANTIBODY, EXTERNAL RESULT: NON REACTIVE
HIV, EXTERNAL RESULT: NON REACTIVE
N. GONORRHOEAE, EXTERNAL RESULT: NOT DETECTED
RPR, EXTERNAL RESULT: NON REACTIVE
RUBELLA TITER, EXTERNAL RESULT: NORMAL
SERVICE CMNT-IMP: ABNORMAL

## 2025-02-25 PROCEDURE — 1100000000 HC RM PRIVATE

## 2025-02-25 PROCEDURE — 6370000000 HC RX 637 (ALT 250 FOR IP): Performed by: OBSTETRICS & GYNECOLOGY

## 2025-02-25 PROCEDURE — 82962 GLUCOSE BLOOD TEST: CPT

## 2025-02-25 RX ADMIN — ACETAMINOPHEN 1000 MG: 500 TABLET ORAL at 15:37

## 2025-02-25 RX ADMIN — DOCUSATE SODIUM 100 MG: 100 CAPSULE, LIQUID FILLED ORAL at 09:34

## 2025-02-25 RX ADMIN — DOCUSATE SODIUM 100 MG: 100 CAPSULE, LIQUID FILLED ORAL at 20:38

## 2025-02-25 RX ADMIN — PRENATAL VIT W/ FE FUMARATE-FA TAB 27-0.8 MG 1 TABLET: 27-0.8 TAB at 09:33

## 2025-02-25 RX ADMIN — IBUPROFEN 800 MG: 800 TABLET ORAL at 05:00

## 2025-02-25 RX ADMIN — OXYCODONE 5 MG: 5 TABLET ORAL at 20:37

## 2025-02-25 RX ADMIN — ACETAMINOPHEN 1000 MG: 500 TABLET ORAL at 02:04

## 2025-02-25 RX ADMIN — IBUPROFEN 800 MG: 800 TABLET ORAL at 17:32

## 2025-02-25 RX ADMIN — ACETAMINOPHEN 1000 MG: 500 TABLET ORAL at 09:29

## 2025-02-25 RX ADMIN — IBUPROFEN 800 MG: 800 TABLET ORAL at 11:20

## 2025-02-25 RX ADMIN — POLYETHYLENE GLYCOL 3350 17 G: 17 POWDER, FOR SOLUTION ORAL at 09:29

## 2025-02-25 RX ADMIN — FAMOTIDINE 20 MG: 20 TABLET, FILM COATED ORAL at 09:34

## 2025-02-25 RX ADMIN — ACETAMINOPHEN 1000 MG: 500 TABLET ORAL at 20:38

## 2025-02-25 ASSESSMENT — PAIN DESCRIPTION - DESCRIPTORS: DESCRIPTORS: SORE;BURNING

## 2025-02-25 ASSESSMENT — PAIN SCALES - GENERAL: PAINLEVEL_OUTOF10: 6

## 2025-02-25 ASSESSMENT — PAIN DESCRIPTION - ORIENTATION: ORIENTATION: LOWER

## 2025-02-25 ASSESSMENT — PAIN DESCRIPTION - LOCATION: LOCATION: ABDOMEN

## 2025-02-25 NOTE — ANESTHESIA POSTPROCEDURE EVALUATION
Department of Anesthesiology  Postprocedure Note    Patient: Caryn Mckee  MRN: [id number]  YOB: 1995  Date of evaluation: 2025    Procedure Summary       Date: 25 Room / Location: [facility] L&D OR  [facility] L&D    Anesthesia Start: 813 Anesthesia Stop:     Procedures:        SECTION (Abdomen)      Labor Analgesia Diagnosis:       Failure to progress in labor      (Failure to progress in labor [O62.2])    Surgeons: Sana Alexis MD Responsible Provider: Rashaad Harman MD    Anesthesia Type: epidural ASA Status: 2 - Emergent            Anesthesia Type: No value filed.    Chaya Phase I: Chaya Score: 9    Chaya Phase II: Chaya Score: 10    Anesthesia Post Evaluation    Patient location during evaluation: floor  Patient participation: complete - patient participated  Level of consciousness: awake and alert  Airway patency: patent  Nausea: well controlled.  Cardiovascular status: acceptable.  Respiratory status: acceptable  Hydration status: stable  Pain management: adequate    No notable events documented.

## 2025-02-25 NOTE — PROGRESS NOTES
Anesthesiology  Post-op Note    Post-op day 1 s/p  via epidural with neuraxial opioids for post-op pain management.  /74   Pulse (!) 105   Temp 96.8 °F (36 °C) (Axillary)   Resp 16   LMP 2024   SpO2 97%   Breastfeeding Unknown   Airway patent, patient appropriately hydrated and appears euvolemic.  Patient is Alert and oriented.  Pain is well controlled.  Pruritus is moderately controlled.  Nausea is absent.  No complaints about back or site of injection.  Motor and sensory function has returned to baseline in lower extremities. Ambulating without difficulty. Patient is satisfied with anesthetic and reports no complications.  Continue current orders, then initiate surgeon's orders for pain management 24 hours after .  Follow up per surgeon.      Kamla Rolle MD  2025

## 2025-02-25 NOTE — PLAN OF CARE
Problem: Chronic Conditions and Co-morbidities  Goal: Patient's chronic conditions and co-morbidity symptoms are monitored and maintained or improved  Outcome: Progressing     Problem: Pain  Goal: Verbalizes/displays adequate comfort level or baseline comfort level  Outcome: Progressing  Flowsheets (Taken 2025)  Verbalizes/displays adequate comfort level or baseline comfort level:   Encourage patient to monitor pain and request assistance   Assess pain using appropriate pain scale   Administer analgesics based on type and severity of pain and evaluate response   Implement non-pharmacological measures as appropriate and evaluate response   Consider cultural and social influences on pain and pain management   Notify Licensed Independent Practitioner if interventions unsuccessful or patient reports new pain     Problem: Skin/Tissue Integrity  Goal: Skin integrity remains intact  Description: 1.  Monitor for areas of redness and/or skin breakdown  2.  Assess vascular access sites hourly  3.  Every 4-6 hours minimum:  Change oxygen saturation probe site  4.  Every 4-6 hours:  If on nasal continuous positive airway pressure, respiratory therapy assess nares and determine need for appliance change or resting period  Outcome: Progressing     Problem: Vaginal Birth or  Section  Goal: Fetal and maternal status remain reassuring during the birth process  Description:  Birth OB-Pregnancy care plan goal which identifies if the fetal and maternal status remain reassuring during the birth process  Outcome: Progressing     Problem: Postpartum  Goal: Experiences normal postpartum course  Description:  Postpartum OB-Pregnancy care plan goal which identifies if the mother is experiencing a normal postpartum course  Outcome: Progressing  Goal: Appropriate maternal -  bonding  Description:  Postpartum OB-Pregnancy care plan goal which identifies if the mother and  are bonding appropriately  Outcome:

## 2025-02-25 NOTE — CARE COORDINATION
Chart reviewed - first time parent.  SW met with patient to complete initial assessment.       provided education on [facility] Postpartum  Home Visit Program.  Family was undecided on need for home visit.  No referral will be made at this time.  Family has this 's contact information should they decide to participate in program.    Patient given informational packet on  mood & anxiety disorders (resources/education).    Family denies any additional needs from  at this time.  Family has 's contact information should any needs/questions arise.    Livier Sharif, JOSETTE-CP, [facility]  [facility]   [phone]

## 2025-02-25 NOTE — PROGRESS NOTES
Dr. Alexis on unit.  MD states that if patient has 4 blood sugars within a 24 hour period order may be discontinued.

## 2025-02-25 NOTE — LACTATION NOTE
This note was copied from a baby's chart.  Spectra S1/2:  Cycle is the number of times the pump pulls per minute.  Fast cycling stimulates let-down, slow cycling expresses available milk.  Vacuum is how strong the pump is pulling.  Power on and press wavy line button to go into let-down mode. Cycle will be 70 (and cannot be changed).  Adjust vacuum to comfort.   Push the level up until it is a little uncomfortable and then back down until comfortable.  Pain does not equal milk.  On let-down mode max is 5 and on Expression mode max is 12.  After 2 minutes (or sooner if milk is spraying), press wavy line button again to go into expression mode.  Cycle should be between 54, 50 or 46.  Again, adjust vacuum to comfort.    There are multiple settings that can be utilized with this pump.  These are the standard instructions.  Olive oil or coconut oil can be used before pumping to help with friction. For additional, brand specific, wireless pump information please check out instructional videos from [employer].  Search your pump brand to see instructions for use, tips and cleaning.  [facility] Lactation [phone]      Flange fit can be an important part of comfortable and efficient pumping.   Standard 24mm flanges may be ok, but new information suggests using a breastshield (flange) closest to your nipple diameter measurement maybe best.  Nipple diameter can fluctuate throughout breastfeeding duration.  Suggest ordering a flange insert variety pack (any brand).  The variety pack contains flange inserts in  13mm, 15mm, 17mm, 19mm and 21mm sizes.  Flange inserts will fit in ANY 24 mm hard flange no matter what brand the pump is.  This includes plug-in and wireless pumps.      19 mm L     18 mm R.       Based on your above measurements (exact measurement/+1-2 mm), suggest trying 19-21 mm flanges to start.  Some people prefer a hard sided flange.  Once you feel you have found your ideal fit, you

## 2025-02-25 NOTE — LACTATION NOTE
This note was copied from a baby's chart.  Lactation visit. Time for baby to feed. Needed latch observation. Baby roused easily and ready to feed now. Assisted on left breast in football hold, everted nipples. Reviewed supportive technique with breast compression and helping to latch baby deeply. Baby latched well. Showed mom manual lip flange. Baby stays on well, fed well x 10 minutes on left breast. Burped and then switched to right breast, also in football hold. Again latched well, feeding in progress now on right side. Great latch. Showed mom breast compression and massage during feeding. Baby with 5% weight loss, bili level low, several voids but has not had a stool since large meconium with birth. Will monitor for now given good latch and feeding and other parameters all normal, MD aware. Keep feeding every 2-3 hours, wake as needed to feed.

## 2025-02-26 VITALS
TEMPERATURE: 97.5 F | OXYGEN SATURATION: 97 % | RESPIRATION RATE: 18 BRPM | DIASTOLIC BLOOD PRESSURE: 86 MMHG | HEART RATE: 99 BPM | SYSTOLIC BLOOD PRESSURE: 124 MMHG

## 2025-02-26 PROCEDURE — 6370000000 HC RX 637 (ALT 250 FOR IP): Performed by: OBSTETRICS & GYNECOLOGY

## 2025-02-26 RX ORDER — OXYCODONE HYDROCHLORIDE 5 MG/1
5 TABLET ORAL EVERY 4 HOURS PRN
Qty: 20 TABLET | Refills: 0 | Status: SHIPPED | OUTPATIENT
Start: 2025-02-26 | End: 2025-03-01

## 2025-02-26 RX ORDER — IBUPROFEN 800 MG/1
800 TABLET, FILM COATED ORAL EVERY 6 HOURS
Qty: 120 TABLET | Refills: 3 | Status: SHIPPED | OUTPATIENT
Start: 2025-02-26

## 2025-02-26 RX ADMIN — IBUPROFEN 800 MG: 800 TABLET ORAL at 00:08

## 2025-02-26 RX ADMIN — IBUPROFEN 800 MG: 800 TABLET ORAL at 12:25

## 2025-02-26 RX ADMIN — DOCUSATE SODIUM 100 MG: 100 CAPSULE, LIQUID FILLED ORAL at 09:38

## 2025-02-26 RX ADMIN — IBUPROFEN 800 MG: 800 TABLET ORAL at 06:19

## 2025-02-26 RX ADMIN — POLYETHYLENE GLYCOL 3350 17 G: 17 POWDER, FOR SOLUTION ORAL at 09:38

## 2025-02-26 RX ADMIN — SIMETHICONE 80 MG: 80 TABLET, CHEWABLE ORAL at 03:03

## 2025-02-26 RX ADMIN — ACETAMINOPHEN 1000 MG: 500 TABLET ORAL at 09:38

## 2025-02-26 RX ADMIN — OXYCODONE 10 MG: 5 TABLET ORAL at 12:24

## 2025-02-26 RX ADMIN — PRENATAL VIT W/ FE FUMARATE-FA TAB 27-0.8 MG 1 TABLET: 27-0.8 TAB at 09:38

## 2025-02-26 RX ADMIN — ACETAMINOPHEN 1000 MG: 500 TABLET ORAL at 03:02

## 2025-02-26 ASSESSMENT — PAIN DESCRIPTION - DESCRIPTORS: DESCRIPTORS: SORE;CRAMPING

## 2025-02-26 ASSESSMENT — PAIN SCALES - GENERAL: PAINLEVEL_OUTOF10: 8

## 2025-02-26 ASSESSMENT — PAIN DESCRIPTION - LOCATION: LOCATION: ABDOMEN

## 2025-02-26 ASSESSMENT — PAIN DESCRIPTION - ORIENTATION: ORIENTATION: ANTERIOR

## 2025-02-26 NOTE — LACTATION NOTE
This note was copied from a baby's chart.  In to see mom and infant for discharge. Observed mom feed baby on right side during visit. Baby looked good on and feeding well, no c/o pain. Baby had just fed on left side before coming in. Completed personal pump demo per request. Reviewed discharge info and how to manage period of engorgement. Answered their lactation questions. No further needs. Plans to follow up at [facility].

## 2025-02-26 NOTE — DISCHARGE SUMMARY
Obstetrical Discharge Summary     Name: Caryn Mckee  MRN: [id number]   SSN: [unfilled]     YOB: 1995   Age: 29 y.o.  Sex: female       Allergies: No Known Allergies     Admit Date:  2025     Discharge Date:       Admitting Physician: [unfilled]     Attending Physician:  Sana Alexis MD     * Admission Diagnoses:  39 weeks gestation of pregnancy [Z3A.39]     * Discharge Diagnoses:     Xander Mckee [[id number]]      [place] Information       Changing the 's delivery date/time could affect patient care.:      Delivery date/time:  25   Delivery type: , Low Transverse    Details:  Trial of labor?: Yes    categorization: Primary    priority: unscheduled   Indications for : Arrest of Descent   Other  Indications: chorio   Skin Incision Type: Pfannenstiel     Uterine Incision: Low Transverse                        * Procedures: No admission procedures for hospital encounter.     * Discharge Condition: Good    * Hospital Course: Normal hospital course following the delivery.    * Disposition: Home    Discharge Medications:      Medication List        START taking these medications      ibuprofen 800 MG tablet  Commonly known as: ADVIL;MOTRIN  Take 1 tablet by mouth every 6 hours     oxyCODONE 5 MG immediate release tablet  Commonly known as: ROXICODONE  Take 1 tablet by mouth every 4 hours as needed for Pain for up to 3 days. Max Daily Amount: 30 mg            CONTINUE taking these medications      BENADRYL ALLERGY PO     CHOLINE PO     Finacea 15 % Foam  Generic drug: Azelaic Acid     FISH OIL PO     omeprazole 40 MG delayed release capsule  Commonly known as: PRILOSEC  Take 1 capsule by mouth every morning (before breakfast)     ONE TOUCH ULTRA 2 w/Device Kit     OneTouch Delica Plus [id number] Misc     PRENATAL PO     triamcinolone 0.1 % cream  Commonly known as: KENALOG            STOP taking these medications

## 2025-02-26 NOTE — PLAN OF CARE
Problem: Chronic Conditions and Co-morbidities  Goal: Patient's chronic conditions and co-morbidity symptoms are monitored and maintained or improved  Outcome: Progressing     Problem: Pain  Goal: Verbalizes/displays adequate comfort level or baseline comfort level  Outcome: Progressing  Flowsheets (Taken 2025)  Verbalizes/displays adequate comfort level or baseline comfort level:   Encourage patient to monitor pain and request assistance   Assess pain using appropriate pain scale   Administer analgesics based on type and severity of pain and evaluate response   Implement non-pharmacological measures as appropriate and evaluate response   Consider cultural and social influences on pain and pain management   Notify Licensed Independent Practitioner if interventions unsuccessful or patient reports new pain     Problem: Skin/Tissue Integrity  Goal: Skin integrity remains intact  Description: 1.  Monitor for areas of redness and/or skin breakdown  2.  Assess vascular access sites hourly  3.  Every 4-6 hours minimum:  Change oxygen saturation probe site  4.  Every 4-6 hours:  If on nasal continuous positive airway pressure, respiratory therapy assess nares and determine need for appliance change or resting period  Outcome: Progressing     Problem: Postpartum  Goal: Experiences normal postpartum course  Description:  Postpartum OB-Pregnancy care plan goal which identifies if the mother is experiencing a normal postpartum course  Outcome: Progressing  Goal: Appropriate maternal -  bonding  Description:  Postpartum OB-Pregnancy care plan goal which identifies if the mother and  are bonding appropriately  Outcome: Progressing  Goal: Establishment of infant feeding pattern  Description:  Postpartum OB-Pregnancy care plan goal which identifies if the mother is establishing a feeding pattern with their   Outcome: Progressing  Goal: Incisions, wounds, or drain sites healing without S/S of

## 2025-02-27 ENCOUNTER — APPOINTMENT (OUTPATIENT)
Dept: PHYSICAL THERAPY | Age: 30
End: 2025-02-27
Payer: COMMERCIAL

## 2025-03-18 ENCOUNTER — CLINICAL DOCUMENTATION (OUTPATIENT)
Dept: PHYSICAL THERAPY | Age: 30
End: 2025-03-18

## 2025-03-18 NOTE — THERAPY EVALUATION
Caryn Mckee  : 1995  Primary: [facility] (Commercial)  Secondary:  [facility] @ [address]  [facility] [address]  Phone: [phone]  Fax: [phone] Plan Frequency: 1x/week for 90 days    Plan of Care/Certification Expiration Date: 25        Plan of Care/Certification Expiration Date:  Plan of Care/Certification Expiration Date: 25    Frequency/Duration: Plan Frequency: 1x/week for 90 days      Time In/Out:   Time In: 1109  Time Out: 1159      PT Visit Info:    Plan Frequency: 1x/week for 90 days  Total # of Visits Approved: 115  Progress Note Due Date: 25  Total # of Visits to Date: 1      Visit Count:  1               OUTPATIENT PHYSICAL THERAPY:             Initial Assessment 3/20/2025               Episode (PFPT)         Treatment Diagnosis:     Other lack of coordination  Muscle weakness (generalized)  Contributing Diagnosis:  Lower abdominal pain, unspecified (R10.30), Pelvic and perineal pain (R10.2), and Pelvic floor dysfunction in female (M62.98)  Medical/Referring Diagnosis:    Encounter for routine postpartum follow-up [Z39.2]      Referring Physician:  Mary Ann Carmona APRN - NP MD Orders:  PT Eval and Treat   Return MD Appt: follows up with OB in April for 6-week follow-up  Date of Onset:  Onset Date: 25     Allergies:  Patient has no known allergies.  Restrictions/Precautions:    None      Medications Last Reviewed:  3/20/2025     SUBJECTIVE   History of Injury/Illness (Reason for Referral):  Ms. Mckee is a 30 yo female referred to pelvic floor physical therapy (PFPT) by Mary Ann Carmona APRN - NP / Encounter for routine postpartum follow-up [Z39.2].    Saw me for PFPT during pregnancy for VALERIE and pelvic pain as well as some incomplete bladder emptying  H/o pain with vaginal penetration   on 25, is currently breastfeeding    Labor started day before she was getting

## 2025-03-18 NOTE — THERAPY DISCHARGE
[facility] @ [address]  [place] [address]  Phone: [phone]  Fax: [phone]    OUTPATIENT PHYSICAL THERAPY  Discontinuation Summary 3/18/2025  Episode  Appt Desk         Caryn Mckee has been seen in physical therapy for 6  visits from 24 to 25, with 1 cancellation and 0 no shows. Ms. Mckee's therapy has come to an end at this time due to:  Change in medical status ( on 25).    Physical Therapy Goals:  Unable to assess at time of discharge.    Cathi Guadarrama, PT

## 2025-03-18 NOTE — PROGRESS NOTES
Caryn Mckee  : 1995  Primary: [facility] (Commercial)  Secondary:  [facility] @ [address]  [place] [address]  Phone: [phone]  Fax: [phone] Plan Frequency: 1x/week for 90 days    Plan of Care/Certification Expiration Date: 25        Plan of Care/Certification Expiration Date:  Plan of Care/Certification Expiration Date: 25    Frequency/Duration: Plan Frequency: 1x/week for 90 days      Time In/Out:   Time In: 1109  Time Out: 1159      PT Visit Info:    Total # of Visits Approved: 115  Progress Note Due Date: 25  Total # of Visits to Date: 1      Visit Count: 1   OUTPATIENT PHYSICAL THERAPY:   Treatment Note 3/20/2025       Episode  (PFPT)               Treatment Diagnosis:    Other lack of coordination  Muscle weakness (generalized)  Contributing Diagnosis:  Lower abdominal pain, unspecified (R10.30), Pelvic and perineal pain (R10.2), and Pelvic floor dysfunction in female (M62.98)  Medical/Referring Diagnosis:    Encounter for routine postpartum follow-up [Z39.2]    Referring Physician:  Mary Ann Carmona APRN - NP MD Orders:  PT Eval and Treat   Return MD Appt:  follows up with OB in April for 6-week follow-up   Date of Onset:  Onset Date: 25  Allergies:   Patient has no known allergies.  Restrictions/Precautions:   None      Interventions Planned (Treatment may consist of any combination of the following):     See Assessment Note    Subjective Comments:   Abdominal pain after  (25)    Initial Pain Level:     2/10  Post Session Pain Level:       3/10  Medications Last Reviewed:  3/20/2025  Updated Objective Findings:  See Evaluation Note from today    Treatment   THERAPEUTIC EXERCISE: (12 minutes): Exercises per grid below to improve mobility, strength, and coordination.  Required minimal visual, verbal, and tactile cues to promote proper body mechanics and promote proper body

## 2025-03-20 ENCOUNTER — HOSPITAL ENCOUNTER (OUTPATIENT)
Dept: PHYSICAL THERAPY | Age: 30
Setting detail: RECURRING SERIES
Discharge: HOME | End: 2025-03-23
Payer: COMMERCIAL

## 2025-03-20 DIAGNOSIS — R27.8 OTHER LACK OF COORDINATION: Primary | ICD-10-CM

## 2025-03-20 DIAGNOSIS — M62.81 MUSCLE WEAKNESS (GENERALIZED): ICD-10-CM

## 2025-03-20 PROCEDURE — 97530 THERAPEUTIC ACTIVITIES: CPT

## 2025-03-20 PROCEDURE — 97162 PT EVAL MOD COMPLEX 30 MIN: CPT

## 2025-03-20 PROCEDURE — 97110 THERAPEUTIC EXERCISES: CPT

## 2025-03-20 ASSESSMENT — PAIN SCALES - GENERAL: PAINLEVEL_OUTOF10: 2

## 2025-03-27 PROCEDURE — 3700000025 EPIDURAL BLOCK: Performed by: STUDENT IN AN ORGANIZED HEALTH CARE EDUCATION/TRAINING PROGRAM

## 2025-03-27 NOTE — ANESTHESIA PROCEDURE NOTES
Epidural Block    Patient location during procedure: patient floor  Start time: 2/23/2025 8:20 AM  End time: 2/23/2025 8:24 AM  Reason for block: labor epidural  Staffing  Performed: anesthesiologist   Anesthesiologist: Rashaad Harman MD  Performed by: Rashaad Harman MD  Authorized by: Rashaad Harman MD    Epidural  Patient position: sitting  Prep: ChloraPrep  Patient monitoring: continuous pulse ox and frequent blood pressure checks  Approach: midline  Location: L4-5  Injection technique: BRANDON saline  Provider prep: mask and sterile gloves  Needle  Needle type: Tuohy   Needle gauge: 17 G  Needle insertion depth: 5 cm  Catheter type: end hole  Catheter size: 19 G  Catheter at skin depth: 10 cm  Test dose: negativeCatheter Secured: tegaderm and tape  Assessment  Hemodynamics: stable  Attempts: 1  Outcomes: patient tolerated procedure well and uncomplicated  Preanesthetic Checklist  Completed: patient identified, IV checked, risks and benefits discussed, surgical/procedural consents, equipment checked, pre-op evaluation, timeout performed, anesthesia consent given, oxygen available and monitors applied/VS acknowledged

## 2025-04-10 ENCOUNTER — HOSPITAL ENCOUNTER (OUTPATIENT)
Dept: PHYSICAL THERAPY | Age: 30
Setting detail: RECURRING SERIES
Discharge: HOME | End: 2025-04-13
Payer: COMMERCIAL

## 2025-04-10 PROCEDURE — 97110 THERAPEUTIC EXERCISES: CPT

## 2025-04-10 PROCEDURE — 97140 MANUAL THERAPY 1/> REGIONS: CPT

## 2025-04-10 PROCEDURE — 97530 THERAPEUTIC ACTIVITIES: CPT

## 2025-04-10 ASSESSMENT — PAIN SCALES - GENERAL: PAINLEVEL_OUTOF10: 0

## 2025-04-10 NOTE — PROGRESS NOTES
get back to walking  A couple of 1 mile walks last week  Wants to be able to garden this summer (bending/kneeling)  Would like to strength train (has a full set of adjustable dumbbells, resistance bands, KBs, and a treadmill)    Initial Pain Level:     0/10  Post Session Pain Level:       0/10  Medications Last Reviewed:  3/20/2025  Updated Objective Findings:   See today    4/10/25:  No redness or irritation at vestibule    Tenderness and TrPs at bilateral hip adductors (slight)    Palpation: via vaginal canal (tender at perineal body)  Superficial Pelvic Floor Musculature (PFM): Tender? Intermediate PFM Tender? Deep PFM Tender?   Superficial Transverse Perineal [x] Right  [] Left  []DNT Deep Transverse Perineal [x] Right  [] Left  []DNT Puborectalis [x] Right  [] Left  []DNT   Ischiocavernosus [] Right  [] Left  []DNT Compressor Urethra [] Right  [] Left  []DNT Pubococcygeus [] Right  [] Left  []DNT   Bulbocavernosus [x] Right  [] Left  []DNT   Iliococcygeus [] Right  [] Left  []DNT       Obturator Internus [] Right  [] Left  []DNT       Coccygeus [] Right  [] Left  [x]DNT         Treatment   THERAPEUTIC EXERCISE: (10 minutes): Exercises per grid below to improve mobility, strength, and coordination.  Required minimal visual, verbal, and tactile cues to promote proper body mechanics and promote proper body breathing techniques.  Progressed resistance, range, and repetitions as indicated.   Date:  3/20/25 Date:  4/10/25 Date:     Activity/Exercise Parameters Parameters Parameters   HEP Updated and reviewed Updated and reviewed    TrA activation 5x coordinated with breathing, 50-70% effort  Added to HEP     Thoracic flexion/extension, seated 10x within comfortable ROM  Added to HEP     Thoracic rotation, seated 10x within comfortable ROM  Added to HEP     PF drops  10x  Added to HEP      THERAPEUTIC ACTIVITY: (10 minutes):  patient education (see chart)  TA Educational Topic Notes Date Completed

## 2025-04-16 ENCOUNTER — HOSPITAL ENCOUNTER (OUTPATIENT)
Dept: PHYSICAL THERAPY | Age: 30
Setting detail: RECURRING SERIES
Discharge: HOME | End: 2025-04-19
Payer: COMMERCIAL

## 2025-04-16 PROCEDURE — 97110 THERAPEUTIC EXERCISES: CPT

## 2025-04-16 PROCEDURE — 97140 MANUAL THERAPY 1/> REGIONS: CPT

## 2025-04-16 PROCEDURE — 97530 THERAPEUTIC ACTIVITIES: CPT

## 2025-04-16 ASSESSMENT — PAIN SCALES - GENERAL: PAINLEVEL_OUTOF10: 0

## 2025-04-16 NOTE — PROGRESS NOTES
Caryn cMkee  : 1995  Primary: [facility] (Commercial)  Secondary:  [facility] @ [address]  [facility] [address]  Phone: [phone]  Fax: [phone] Plan Frequency: 1x/week for 90 days    Plan of Care/Certification Expiration Date: 25        Plan of Care/Certification Expiration Date:  Plan of Care/Certification Expiration Date: 25    Frequency/Duration: Plan Frequency: 1x/week for 90 days      Time In/Out:   Time In: 1405  Time Out: 1503      PT Visit Info:    Total # of Visits Approved: 115  Progress Note Due Date: 25  Total # of Visits to Date: 3      Visit Count: 3   OUTPATIENT PHYSICAL THERAPY:   Treatment Note 2025       Episode  (PFPT)               Treatment Diagnosis:    Other lack of coordination  Muscle weakness (generalized)  Contributing Diagnosis:  Lower abdominal pain, unspecified (R10.30), Pelvic and perineal pain (R10.2), and Pelvic floor dysfunction in female (M62.98)  Medical/Referring Diagnosis:    Encounter for routine postpartum follow-up [Z39.2]    Referring Physician:  Mary Ann Carmona APRN - NP MD Orders:  PT Eval and Treat   Return MD Appt:  follows up with OB in April for 6-week follow-up   Date of Onset:  Onset Date: 25  Allergies:   Patient has no known allergies.  Restrictions/Precautions:   None      Interventions Planned (Treatment may consist of any combination of the following):     See Assessment Note    Subjective Comments:   Abdominal pain after  (25)    Abdominal pain has been better  Walked 1.25 miles the other day with no issues  Anal fissure is going away she thinks, it's less painful to go now  Some more bleeding after our last visit but bleeding has slowed significantly    Nitroglycerin ointment, did sitz bath for anal fissures once  Working on relaxing PFM, doing PF drops, DB, etc.  HEP: exercises going well  Hasn't attempted intercourse yet,

## 2025-04-24 ENCOUNTER — HOSPITAL ENCOUNTER (OUTPATIENT)
Dept: PHYSICAL THERAPY | Age: 30
Setting detail: RECURRING SERIES
Discharge: HOME | End: 2025-04-27
Payer: COMMERCIAL

## 2025-04-24 PROCEDURE — 97110 THERAPEUTIC EXERCISES: CPT

## 2025-04-24 ASSESSMENT — PAIN SCALES - GENERAL: PAINLEVEL_OUTOF10: 0

## 2025-04-24 NOTE — PROGRESS NOTES
Caryn Mckee  : 1995  Primary: [facility] [address] [facility] (Commercial)  Secondary:  [facility] @ [address]  [facility] [address]  Phone: [phone]  Fax: [phone] Plan Frequency: 1x/week for 90 days    Plan of Care/Certification Expiration Date: 25        Plan of Care/Certification Expiration Date:  Plan of Care/Certification Expiration Date: 25    Frequency/Duration: Plan Frequency: 1x/week for 90 days      Time In/Out:   Time In: 1507  Time Out: 1556      PT Visit Info:    Total # of Visits Approved: 115  Progress Note Due Date: 25  Total # of Visits to Date: 4      Visit Count: 4   OUTPATIENT PHYSICAL THERAPY:   Treatment Note 2025       Episode  (PFPT)               Treatment Diagnosis:    Other lack of coordination  Muscle weakness (generalized)  Contributing Diagnosis:  Lower abdominal pain, unspecified (R10.30), Pelvic and perineal pain (R10.2), and Pelvic floor dysfunction in female (M62.98)  Medical/Referring Diagnosis:    Encounter for routine postpartum follow-up [Z39.2]    Referring Physician:  Mary Ann Carmona APRN - NP MD Orders:  PT Eval and Treat   Return MD Appt:  follows up with OB in April for 6-week follow-up   Date of Onset:  Onset Date: 25  Allergies:   Patient has no known allergies.  Restrictions/Precautions:   None      Interventions Planned (Treatment may consist of any combination of the following):     See Assessment Note    Subjective Comments:   Abdominal pain after  (25)    1.25 mile walk with her 12 lb infant on her chest as well - no issues!    Abdominal pain has been better  Anal fissure is going away she thinks, it's less painful to go now - unsure if it's a hemorrhoid, ordered some preparation H  Stools are softer so BMs are easier  Nitroglycerin ointment, did sitz bath for anal fissures once  Working on relaxing PFM, doing PF drops, DB, etc.  HEP: exercises going

## 2025-04-30 ENCOUNTER — HOSPITAL ENCOUNTER (OUTPATIENT)
Dept: PHYSICAL THERAPY | Age: 30
Setting detail: RECURRING SERIES
Discharge: HOME | End: 2025-05-03
Payer: COMMERCIAL

## 2025-04-30 PROCEDURE — 97110 THERAPEUTIC EXERCISES: CPT

## 2025-04-30 PROCEDURE — 97140 MANUAL THERAPY 1/> REGIONS: CPT

## 2025-04-30 ASSESSMENT — PAIN SCALES - GENERAL: PAINLEVEL_OUTOF10: 0

## 2025-04-30 NOTE — PROGRESS NOTES
Caryn Mckee  : 1995  Primary: [facility] [address] [facility] (Commercial)  Secondary:  [facility] @ [address]  [place] [address]  Phone: [phone]  Fax: [phone] Plan Frequency: 1x/week for 90 days    Plan of Care/Certification Expiration Date: 25        Plan of Care/Certification Expiration Date:  Plan of Care/Certification Expiration Date: 25    Frequency/Duration: Plan Frequency: 1x/week for 90 days      Time In/Out:   Time In: 1407  Time Out: 1502     PT Visit Info:    Total # of Visits Approved: 115  Progress Note Due Date: 25  Total # of Visits to Date: 5      Visit Count: 5   OUTPATIENT PHYSICAL THERAPY:   Treatment Note 2025       Episode  (PFPT)               Treatment Diagnosis:    Other lack of coordination  Muscle weakness (generalized)  Contributing Diagnosis:  Lower abdominal pain, unspecified (R10.30), Pelvic and perineal pain (R10.2), and Pelvic floor dysfunction in female (M62.98)  Medical/Referring Diagnosis:    Encounter for routine postpartum follow-up [Z39.2]    Referring Physician:  Mary Ann Carmona APRN - NP MD Orders:  PT Eval and Treat   Return MD Appt:  follows up with OB in April for 6-week follow-up   Date of Onset:  Onset Date: 25  Allergies:   Patient has no known allergies.  Restrictions/Precautions:   None      Interventions Planned (Treatment may consist of any combination of the following):     See Assessment Note    Subjective Comments:   Abdominal pain after  (25)    Hasn't been able to get to all of her exercises due to caring for baby  Has been walking with him in the stroller a lot this week    Has been doing scar mobilization at least once per day  No abdominal pain this week  Anal fissure is definitely getting better - can tell it's there but it doesn't hurt when she has a BM (1.5/10)    Hasn't attempted intercourse yet, though has been cleared  NP checked

## 2025-05-14 ENCOUNTER — APPOINTMENT (OUTPATIENT)
Dept: PHYSICAL THERAPY | Age: 30
End: 2025-05-14
Payer: COMMERCIAL

## 2025-05-15 ENCOUNTER — HOSPITAL ENCOUNTER (OUTPATIENT)
Dept: PHYSICAL THERAPY | Age: 30
Setting detail: RECURRING SERIES
Discharge: HOME | End: 2025-05-18
Payer: COMMERCIAL

## 2025-05-15 PROCEDURE — 97140 MANUAL THERAPY 1/> REGIONS: CPT

## 2025-05-15 PROCEDURE — 97530 THERAPEUTIC ACTIVITIES: CPT

## 2025-05-15 PROCEDURE — 97110 THERAPEUTIC EXERCISES: CPT

## 2025-05-15 ASSESSMENT — PAIN SCALES - GENERAL: PAINLEVEL_OUTOF10: 0

## 2025-05-15 NOTE — PROGRESS NOTES
Caryn Mckee  : 1995  Primary: [facility] [address] [facility] (Commercial)  Secondary:  [facility] @ [address]  [place] [address]  Phone: [phone]  Fax: [phone] Plan Frequency: 1x/week for 90 days    Plan of Care/Certification Expiration Date: 25        Plan of Care/Certification Expiration Date:  Plan of Care/Certification Expiration Date: 25    Frequency/Duration: Plan Frequency: 1x/week for 90 days      Time In/Out:   Time In: 906  Time Out: 1002     PT Visit Info:    Total # of Visits Approved: 115  Progress Note Due Date: 25  Total # of Visits to Date: 6      Visit Count: 6   OUTPATIENT PHYSICAL THERAPY:   Treatment Note 5/15/2025       Episode  (PFPT)               Treatment Diagnosis:    Other lack of coordination  Muscle weakness (generalized)  Contributing Diagnosis:  Lower abdominal pain, unspecified (R10.30), Pelvic and perineal pain (R10.2), and Pelvic floor dysfunction in female (M62.98)  Medical/Referring Diagnosis:    Encounter for routine postpartum follow-up [Z39.2]    Referring Physician:  Mary Ann Carmona APRN - NP MD Orders:  PT Eval and Treat   Return MD Appt:  follows up with OB in April for 6-week follow-up   Date of Onset:  Onset Date: 25  Allergies:   Patient has no known allergies.  Restrictions/Precautions:   None      Interventions Planned (Treatment may consist of any combination of the following):     See Assessment Note    Subjective Comments:   Abdominal pain after  (25)    Does her exercises during the baby's tummy time  Exercises going well  10,000 steps for a couple of days    Has been doing scar mobilization at least once per day  No abdominal pain recently  Anal fissure is definitely better - can tell it's there but it doesn't hurt when she has a BM (.5/10)    Hasn't attempted intercourse yet, though has been cleared    Wants to be able to garden this summer

## (undated) DEVICE — TUBING, SUCTION, 1/4" X 10', STRAIGHT: Brand: MEDLINE

## (undated) DEVICE — SUTURE VICRYL SZ 0 L36IN ABSRB UD L36MM CT-1 1/2 CIR J946H

## (undated) DEVICE — SUTURE VICRYL SZ 4-0 L18IN ABSRB UD L19MM PS-2 3/8 CIR PRIM J496H

## (undated) DEVICE — STERILE POLYISOPRENE POWDER-FREE SURGICAL GLOVES: Brand: PROTEXIS

## (undated) DEVICE — ELECTRODE PT RET AD L9FT HI MOIST COND ADH HYDRGEL CORDED

## (undated) DEVICE — SUTURE PLN GUT SZ 2-0 L27IN ABSRB YELLOWISH TAN L40MM CT 853H

## (undated) DEVICE — TRAY PREP DRY W/ PREM GLV 2 APPL 6 SPNG 2 UNDPD 1 OVERWRAP

## (undated) DEVICE — KENDALL SCD EXPRESS SLEEVES, KNEE LENGTH, MEDIUM: Brand: KENDALL SCD

## (undated) DEVICE — PACK SURG CUST C SECT CDS SFE

## (undated) DEVICE — PENCIL ES L3M BTTN SWCH S STL HEX LOK BLDE ELECTRD HOLSTER

## (undated) DEVICE — SOLUTION IRRIG 1000ML H2O STRL BLT

## (undated) DEVICE — SUTURE VICRYL SZ 2-0 L36IN ABSRB VLT L36MM CT-1 1/2 CIR J345H

## (undated) DEVICE — Device: Brand: PORTEX

## (undated) DEVICE — AMD ANTIMICROBIAL NON-ADHERENT PAD,0.2% POLYHEXAMETHYLENE BIGUANIDE HCI (PHMB): Brand: TELFA

## (undated) DEVICE — SOLUTION IV 1000ML 0.9% SOD CHL

## (undated) DEVICE — Z DISCONTINUED NO SUB IDED TRAY CATH 16FR PVC INTMIT STR TIP PREATTACH TO 1000ML COLL

## (undated) DEVICE — AMD ANTIMICROBIAL GAUZE SPONGES,12 PLY USP TYPE VII, 0.2% POLYHEXAMETHYLENE BIGUANIDE HCI (PHMB): Brand: CURITY